# Patient Record
Sex: FEMALE | Race: WHITE | NOT HISPANIC OR LATINO | Employment: OTHER | URBAN - NONMETROPOLITAN AREA
[De-identification: names, ages, dates, MRNs, and addresses within clinical notes are randomized per-mention and may not be internally consistent; named-entity substitution may affect disease eponyms.]

---

## 2017-05-30 RX ORDER — LISINOPRIL 10 MG/1
TABLET ORAL
Qty: 90 TABLET | Refills: 0 | Status: SHIPPED | OUTPATIENT
Start: 2017-05-30 | End: 2017-11-27 | Stop reason: SDUPTHER

## 2017-08-23 ENCOUNTER — LAB (OUTPATIENT)
Dept: LAB | Facility: OTHER | Age: 82
End: 2017-08-23

## 2017-08-23 ENCOUNTER — OFFICE VISIT (OUTPATIENT)
Dept: FAMILY MEDICINE CLINIC | Facility: CLINIC | Age: 82
End: 2017-08-23

## 2017-08-23 VITALS
DIASTOLIC BLOOD PRESSURE: 72 MMHG | BODY MASS INDEX: 22.66 KG/M2 | WEIGHT: 136 LBS | HEIGHT: 65 IN | SYSTOLIC BLOOD PRESSURE: 142 MMHG

## 2017-08-23 DIAGNOSIS — R63.4 ABNORMAL WEIGHT LOSS: ICD-10-CM

## 2017-08-23 DIAGNOSIS — C50.919 PRIMARY MALIGNANT NEOPLASM OF BREAST, UNSPECIFIED LATERALITY (HCC): Primary | ICD-10-CM

## 2017-08-23 LAB
ALBUMIN SERPL-MCNC: 3.6 G/DL (ref 3.2–5.5)
ALBUMIN/GLOB SERPL: 0.9 G/DL (ref 1–3)
ALP SERPL-CCNC: 67 U/L (ref 15–121)
ALT SERPL W P-5'-P-CCNC: 11 U/L (ref 10–60)
ANION GAP SERPL CALCULATED.3IONS-SCNC: 15 MMOL/L (ref 5–15)
AST SERPL-CCNC: 22 U/L (ref 10–60)
BILIRUB SERPL-MCNC: 1.1 MG/DL (ref 0.2–1)
BUN BLD-MCNC: 19 MG/DL (ref 8–25)
BUN/CREAT SERPL: 15.8 (ref 7–25)
CALCIUM SPEC-SCNC: 9.5 MG/DL (ref 8.4–10.8)
CHLORIDE SERPL-SCNC: 102 MMOL/L (ref 100–112)
CO2 SERPL-SCNC: 22 MMOL/L (ref 20–32)
CREAT BLD-MCNC: 1.2 MG/DL (ref 0.4–1.3)
DEPRECATED RDW RBC AUTO: 51.6 FL (ref 36.4–46.3)
EOSINOPHIL # BLD MANUAL: 0.11 10*3/MM3 (ref 0–0.7)
EOSINOPHIL NFR BLD MANUAL: 1 % (ref 0–7)
ERYTHROCYTE [DISTWIDTH] IN BLOOD BY AUTOMATED COUNT: 16.7 % (ref 11.5–14.5)
GFR SERPL CREATININE-BSD FRML MDRD: 43 ML/MIN/1.73 (ref 39–90)
GLOBULIN UR ELPH-MCNC: 3.8 GM/DL (ref 2.5–4.6)
GLUCOSE BLD-MCNC: 125 MG/DL (ref 70–100)
HCT VFR BLD AUTO: 21.8 % (ref 35–45)
HGB BLD-MCNC: 6.4 G/DL (ref 12–15.5)
HYPOCHROMIA BLD QL: ABNORMAL
LYMPHOCYTES # BLD MANUAL: 0.67 10*3/MM3 (ref 0.6–4.2)
LYMPHOCYTES NFR BLD MANUAL: 10 % (ref 0–12)
LYMPHOCYTES NFR BLD MANUAL: 6 % (ref 10–50)
MCH RBC QN AUTO: 25.7 PG (ref 26.5–34)
MCHC RBC AUTO-ENTMCNC: 29.4 G/DL (ref 31.4–36)
MCV RBC AUTO: 87.6 FL (ref 80–98)
MONOCYTES # BLD AUTO: 1.11 10*3/MM3 (ref 0–0.9)
NEUTROPHILS # BLD AUTO: 9.22 10*3/MM3 (ref 2–8.6)
NEUTROPHILS NFR BLD MANUAL: 78 % (ref 37–80)
NEUTS BAND NFR BLD MANUAL: 5 % (ref 0–5)
OVALOCYTES BLD QL SMEAR: ABNORMAL
PLATELET # BLD AUTO: 597 10*3/MM3 (ref 150–450)
PMV BLD AUTO: 9.6 FL (ref 8–12)
POTASSIUM BLD-SCNC: 4.2 MMOL/L (ref 3.4–5.4)
PROT SERPL-MCNC: 7.4 G/DL (ref 6.7–8.2)
RBC # BLD AUTO: 2.49 10*6/MM3 (ref 3.77–5.16)
SMALL PLATELETS BLD QL SMEAR: ABNORMAL
SODIUM BLD-SCNC: 139 MMOL/L (ref 134–146)
WBC MORPH BLD: NORMAL
WBC NRBC COR # BLD: 11.11 10*3/MM3 (ref 3.2–9.8)

## 2017-08-23 PROCEDURE — 99214 OFFICE O/P EST MOD 30 MIN: CPT | Performed by: FAMILY MEDICINE

## 2017-08-23 PROCEDURE — 80053 COMPREHEN METABOLIC PANEL: CPT | Performed by: FAMILY MEDICINE

## 2017-08-23 PROCEDURE — 36415 COLL VENOUS BLD VENIPUNCTURE: CPT | Performed by: FAMILY MEDICINE

## 2017-08-23 PROCEDURE — 85025 COMPLETE CBC W/AUTO DIFF WBC: CPT | Performed by: FAMILY MEDICINE

## 2017-08-23 RX ORDER — ZOLPIDEM TARTRATE 5 MG/1
5 TABLET ORAL NIGHTLY PRN
Qty: 30 TABLET | Refills: 2 | Status: SHIPPED | OUTPATIENT
Start: 2017-08-23 | End: 2017-09-13

## 2017-08-23 RX ORDER — RANITIDINE 150 MG/1
150 TABLET ORAL NIGHTLY
COMMUNITY
End: 2017-08-30 | Stop reason: SDUPTHER

## 2017-08-23 RX ORDER — OXYCODONE HYDROCHLORIDE 15 MG/1
15 TABLET ORAL EVERY 6 HOURS PRN
Qty: 120 TABLET | Refills: 0 | Status: SHIPPED | OUTPATIENT
Start: 2017-08-23 | End: 2017-09-13 | Stop reason: SDUPTHER

## 2017-08-23 NOTE — PROGRESS NOTES
Subjective   Aimee Gilmore is a 85 y.o. female who presents to the office with her daughter with progressive weakness and weight loss decreased appetite and difficulty walking.  She's having a lot of pain in the hips and legs as well as other joints and limits her activity because of this.  Also she's weak and just has a poor appetite most of the time.  She doesn't sleep well her daughter says that she sets up at night just mostly rocking back and forth with pain.  Had a significant decline over the past few months and has not even left the house hardly in the last year.    History of Present Illness   Arthritis   Presents for follow-up visit. Complains of pain, stiffness and joint swelling, reports no joint warmth. The symptoms have been worsening. Affected location: diffuse. Pain is at a severity of 8/10. Associated symptoms include fatigue, pain at night and pain while resting. Pertinent negatives include no diarrhea, dry eyes, dry mouth, dysuria, fever, rash, Raynaud's syndrome, uveitis or weight loss. Compliance with total regimen is %. Compliance with medications is %.     The following portions of the patient's history were reviewed and updated as appropriate: allergies, current medications, past family history, past medical history, past social history, past surgical history and problem list.    Review of Systems   Constitutional: Positive for activity change, appetite change and unexpected weight change.   HENT: Negative.    Respiratory: Negative.  Negative for shortness of breath.    Cardiovascular: Negative.  Negative for chest pain.   Gastrointestinal: Negative.    Musculoskeletal: Positive for arthralgias, back pain, gait problem, myalgias and neck pain. Negative for joint swelling.   Skin: Negative.    Allergic/Immunologic: Negative for immunocompromised state.   Neurological: Positive for weakness and light-headedness. Negative for dizziness, tremors, seizures, syncope and  numbness.   Hematological: Negative.    Psychiatric/Behavioral: Positive for dysphoric mood. Negative for agitation, confusion and sleep disturbance. The patient is nervous/anxious.    All other systems reviewed and are negative.      Objective   Physical Exam   Constitutional: She is oriented to person, place, and time. No distress.   Very thin and frail overall   HENT:   Head: Normocephalic and atraumatic.   Nose: Nose normal.   Mouth/Throat: Oropharynx is clear and moist.   Eyes: Pupils are equal, round, and reactive to light. Right eye exhibits no discharge. Left eye exhibits no discharge. No scleral icterus.   Neck: Normal range of motion. Neck supple. No tracheal deviation present. No thyromegaly present.   Cardiovascular: Normal rate, regular rhythm and intact distal pulses.  Exam reveals gallop. Exam reveals no friction rub.    No murmur heard.  Abdominal: Soft. Bowel sounds are normal. She exhibits no distension and no mass. There is no tenderness. There is no rebound and no guarding.   Musculoskeletal: She exhibits tenderness. She exhibits no edema or deformity.   Neurological: She is alert and oriented to person, place, and time. No cranial nerve deficit. Coordination abnormal.   Skin: Skin is warm and dry. She is not diaphoretic.   Psychiatric: She has a normal mood and affect. Her behavior is normal.   Nursing note and vitals reviewed.      Assessment/Plan   Aimee was seen today for hip pain and insomnia.    Diagnoses and all orders for this visit:    Primary malignant neoplasm of breast, unspecified laterality  -     CT Abdomen Pelvis Without Contrast  -     CT Chest Without Contrast    Abnormal weight loss  -     CBC & Differential; Future  -     Comprehensive Metabolic Panel    Other orders  -     oxyCODONE (ROXICODONE) 15 MG immediate release tablet; Take 1 tablet by mouth Every 6 (Six) Hours As Needed for Moderate Pain  or Severe Pain .  -     zolpidem (AMBIEN) 5 MG tablet; Take 1 tablet by mouth  At Night As Needed for Sleep.  Given her history of breast cancer certainly want to survey for metastatic or recurrent disease given her weight loss and general decline.  We'll check CT of the abdomen and pelvis and consider bone scan    Recommend boost or ensure shakes for supplements and will consider an appetite stimulant depending on outcome of further workup    The current pain medicine she is on is not helping.  Given that we suspect the possibility of recurrent cancer or metastatic cancer, we'll start her on the oxycodone 15 mg tablets up to 4 a day    We'll try Ambien 5 mg to help her sleep at night.  Also try the pain medicine first and if this doesn't help her sleep that they can try the Ambien.  We'll follow up with her on labs as above

## 2017-08-23 NOTE — PROGRESS NOTES
Please call the patient regarding her abnormal result.  She is very anemic.  Needs to be transfused ASAP.  See where they want to go for this and send orders for type and cross 3 units of packed red blood cells and transfuse.  I want to see her back one week after to recheck.  The anemia may be nutritional or maybe blood loss.  After the transfusion we will do more for workup.  We'll probably need to call her daughter or  as she is having some memory issues.

## 2017-08-30 ENCOUNTER — LAB (OUTPATIENT)
Dept: LAB | Facility: OTHER | Age: 82
End: 2017-08-30

## 2017-08-30 ENCOUNTER — OFFICE VISIT (OUTPATIENT)
Dept: FAMILY MEDICINE CLINIC | Facility: CLINIC | Age: 82
End: 2017-08-30

## 2017-08-30 VITALS
HEIGHT: 65 IN | DIASTOLIC BLOOD PRESSURE: 90 MMHG | WEIGHT: 136 LBS | SYSTOLIC BLOOD PRESSURE: 142 MMHG | BODY MASS INDEX: 22.66 KG/M2

## 2017-08-30 DIAGNOSIS — M15.9 OSTEOARTHRITIS OF MULTIPLE JOINTS, UNSPECIFIED OSTEOARTHRITIS TYPE: ICD-10-CM

## 2017-08-30 DIAGNOSIS — C50.919 PRIMARY MALIGNANT NEOPLASM OF BREAST, UNSPECIFIED LATERALITY (HCC): ICD-10-CM

## 2017-08-30 DIAGNOSIS — R16.0 LIVER MASS, RIGHT LOBE: Primary | ICD-10-CM

## 2017-08-30 DIAGNOSIS — D64.9 ANEMIA, UNSPECIFIED TYPE: ICD-10-CM

## 2017-08-30 DIAGNOSIS — F03.90 DEMENTIA WITHOUT BEHAVIORAL DISTURBANCE, UNSPECIFIED DEMENTIA TYPE: ICD-10-CM

## 2017-08-30 LAB
BASOPHILS # BLD AUTO: 0.01 10*3/MM3 (ref 0–0.2)
BASOPHILS NFR BLD AUTO: 0.1 % (ref 0–2)
DEPRECATED RDW RBC AUTO: 57.5 FL (ref 36.4–46.3)
EOSINOPHIL # BLD AUTO: 0.23 10*3/MM3 (ref 0–0.7)
EOSINOPHIL NFR BLD AUTO: 2.3 % (ref 0–7)
ERYTHROCYTE [DISTWIDTH] IN BLOOD BY AUTOMATED COUNT: 17.8 % (ref 11.5–14.5)
HCT VFR BLD AUTO: 40.8 % (ref 35–45)
HGB BLD-MCNC: 12.6 G/DL (ref 12–15.5)
LYMPHOCYTES # BLD AUTO: 1.58 10*3/MM3 (ref 0.6–4.2)
LYMPHOCYTES NFR BLD AUTO: 16 % (ref 10–50)
MCH RBC QN AUTO: 28.1 PG (ref 26.5–34)
MCHC RBC AUTO-ENTMCNC: 30.9 G/DL (ref 31.4–36)
MCV RBC AUTO: 90.9 FL (ref 80–98)
MONOCYTES # BLD AUTO: 1.35 10*3/MM3 (ref 0–0.9)
MONOCYTES NFR BLD AUTO: 13.7 % (ref 0–12)
NEUTROPHILS # BLD AUTO: 6.72 10*3/MM3 (ref 2–8.6)
NEUTROPHILS NFR BLD AUTO: 67.9 % (ref 37–80)
PLATELET # BLD AUTO: 318 10*3/MM3 (ref 150–450)
PMV BLD AUTO: 10.7 FL (ref 8–12)
RBC # BLD AUTO: 4.49 10*6/MM3 (ref 3.77–5.16)
WBC NRBC COR # BLD: 9.89 10*3/MM3 (ref 3.2–9.8)

## 2017-08-30 PROCEDURE — 99214 OFFICE O/P EST MOD 30 MIN: CPT | Performed by: FAMILY MEDICINE

## 2017-08-30 PROCEDURE — 85025 COMPLETE CBC W/AUTO DIFF WBC: CPT | Performed by: FAMILY MEDICINE

## 2017-08-30 PROCEDURE — 36415 COLL VENOUS BLD VENIPUNCTURE: CPT | Performed by: FAMILY MEDICINE

## 2017-08-30 RX ORDER — RANITIDINE 150 MG/1
150 TABLET ORAL NIGHTLY
Qty: 30 TABLET | Refills: 5 | Status: SHIPPED | OUTPATIENT
Start: 2017-08-30 | End: 2018-03-02 | Stop reason: SDUPTHER

## 2017-08-30 RX ORDER — DONEPEZIL HYDROCHLORIDE 5 MG/1
5 TABLET, FILM COATED ORAL NIGHTLY
Qty: 30 TABLET | Refills: 5 | Status: SHIPPED | OUTPATIENT
Start: 2017-08-30 | End: 2018-03-08 | Stop reason: SDUPTHER

## 2017-08-30 RX ORDER — AMITRIPTYLINE HYDROCHLORIDE 10 MG/1
TABLET, FILM COATED ORAL
Qty: 30 TABLET | Refills: 5 | Status: SHIPPED | OUTPATIENT
Start: 2017-08-30 | End: 2018-03-12 | Stop reason: SDUPTHER

## 2017-08-30 NOTE — PROGRESS NOTES
Subjective   Aimee Gilmore is a 85 y.o. female here with her daughter today for follow-up.  She was recently seen for overall failure to thrive including poor appetite and weight loss memory issues increasing arthritis and gait difficulty.  Her daughter has now brought the patient to her home and is trying to care for her there.  She still has a very poor appetite.  Labs done last week revealed that she was markedly anemic with a hemoglobin of 6.  She was transfused and doesn't feel much better.  She had a CT showing a 1.3 cm liver lesion that needs further evaluation.  She does have a history of breast cancer and it is concerning for a metastatic recurrent lesion.  Her daughter and I had talked to her last visit also about some medication for dementia.  They would like to go ahead and start on this.    History of Present Illness     The following portions of the patient's history were reviewed and updated as appropriate: allergies, current medications, past family history, past medical history, past social history, past surgical history and problem list.    Review of Systems   Constitutional: Positive for activity change, appetite change and unexpected weight change.   HENT: Negative.    Respiratory: Negative.  Negative for shortness of breath.    Cardiovascular: Negative.  Negative for chest pain.   Gastrointestinal: Negative.    Musculoskeletal: Positive for arthralgias, back pain, gait problem, myalgias and neck pain. Negative for joint swelling.   Skin: Negative.    Allergic/Immunologic: Negative for immunocompromised state.   Neurological: Positive for weakness and light-headedness. Negative for dizziness, tremors, seizures, syncope and numbness.   Hematological: Negative.    Psychiatric/Behavioral: Positive for dysphoric mood. Negative for agitation, confusion and sleep disturbance. The patient is nervous/anxious.    All other systems reviewed and are negative.      Objective   Physical Exam    Constitutional: She is oriented to person, place, and time. No distress.   Very thin and frail overall   HENT:   Head: Normocephalic and atraumatic.   Nose: Nose normal.   Mouth/Throat: Oropharynx is clear and moist.   Eyes: Pupils are equal, round, and reactive to light. Right eye exhibits no discharge. Left eye exhibits no discharge. No scleral icterus.   Neck: Normal range of motion. Neck supple. No tracheal deviation present. No thyromegaly present.   Cardiovascular: Normal rate, regular rhythm and intact distal pulses.  Exam reveals gallop. Exam reveals no friction rub.    No murmur heard.  Abdominal: Soft. Bowel sounds are normal. She exhibits no distension and no mass. There is no tenderness. There is no rebound and no guarding.   Musculoskeletal: She exhibits tenderness. She exhibits no edema or deformity.   Neurological: She is alert and oriented to person, place, and time. No cranial nerve deficit. Coordination abnormal.   Skin: Skin is warm and dry. She is not diaphoretic.   Psychiatric: She has a normal mood and affect. Her behavior is normal.   Nursing note and vitals reviewed.      Assessment/Plan   Aimee was seen today for follow-up.    Diagnoses and all orders for this visit:    Liver mass, right lobe  -     CT Abdomen Pelvis With Contrast    Primary malignant neoplasm of breast, unspecified laterality    Osteoarthritis of multiple joints, unspecified osteoarthritis type    Anemia, unspecified type  -     CBC & Differential; Future    Dementia without behavioral disturbance, unspecified dementia type    Other orders  -     raNITIdine (ZANTAC) 150 MG tablet; Take 1 tablet by mouth Every Night.  -     donepezil (ARICEPT) 5 MG tablet; Take 1 tablet by mouth Every Night.  -     amitriptyline (ELAVIL) 10 MG tablet; 1 tablet nightly    Will get a CT with contrast to further characterize the liver lesion and follow-up accordingly    We'll add amitriptyline at bedtime to help with her appetite and sleep  issues and continue Aricept at 5 mg    Discussed home health and her daughter will let me know if they decide for this and we will send him out to help    We'll get another CBC today to see if further transfusion is needed    We'll start Aricept 5 mg for dementia symptoms.  Cautioned that this could affect her appetite and family will watch for signs of this very closely     Labs are reviewed with patient.      Lab on 08/23/2017   Component Date Value Ref Range Status   • WBC 08/23/2017 11.11* 3.20 - 9.80 10*3/mm3 Final   • RBC 08/23/2017 2.49* 3.77 - 5.16 10*6/mm3 Final   • Hemoglobin 08/23/2017 6.4* 12.0 - 15.5 g/dL Final   • Hematocrit 08/23/2017 21.8* 35.0 - 45.0 % Final   • MCV 08/23/2017 87.6  80.0 - 98.0 fL Final   • MCH 08/23/2017 25.7* 26.5 - 34.0 pg Final   • MCHC 08/23/2017 29.4* 31.4 - 36.0 g/dL Final   • RDW 08/23/2017 16.7* 11.5 - 14.5 % Final   • RDW-SD 08/23/2017 51.6* 36.4 - 46.3 fl Final   • MPV 08/23/2017 9.6  8.0 - 12.0 fL Final   • Platelets 08/23/2017 597* 150 - 450 10*3/mm3 Final   • Neutrophil % 08/23/2017 78.0  37.0 - 80.0 % Final   • Lymphocyte % 08/23/2017 6.0* 10.0 - 50.0 % Final   • Monocyte % 08/23/2017 10.0  0.0 - 12.0 % Final   • Eosinophil % 08/23/2017 1.0  0.0 - 7.0 % Final   • Bands %  08/23/2017 5.0  0.0 - 5.0 % Final   • Neutrophils Absolute 08/23/2017 9.22* 2.00 - 8.60 10*3/mm3 Final   • Lymphocytes Absolute 08/23/2017 0.67  0.60 - 4.20 10*3/mm3 Final   • Monocytes Absolute 08/23/2017 1.11* 0.00 - 0.90 10*3/mm3 Final   • Eosinophils Absolute 08/23/2017 0.11  0.00 - 0.70 10*3/mm3 Final   • Hypochromia 08/23/2017 Slight/1+  None Seen Final   • Ovalocytes 08/23/2017 Slight/1+  None Seen Final   • WBC Morphology 08/23/2017 Normal  Normal Final   • Platelet Estimate 08/23/2017 Increased  Normal Final   Office Visit on 08/23/2017   Component Date Value Ref Range Status   • Glucose 08/23/2017 125* 70 - 100 mg/dL Final   • BUN 08/23/2017 19  8 - 25 mg/dL Final   • Creatinine 08/23/2017  1.20  0.40 - 1.30 mg/dL Final   • Sodium 08/23/2017 139  134 - 146 mmol/L Final   • Potassium 08/23/2017 4.2  3.4 - 5.4 mmol/L Final   • Chloride 08/23/2017 102  100 - 112 mmol/L Final   • CO2 08/23/2017 22.0  20.0 - 32.0 mmol/L Final   • Calcium 08/23/2017 9.5  8.4 - 10.8 mg/dL Final   • Total Protein 08/23/2017 7.4  6.7 - 8.2 g/dL Final   • Albumin 08/23/2017 3.60  3.20 - 5.50 g/dL Final   • ALT (SGPT) 08/23/2017 11  10 - 60 U/L Final   • AST (SGOT) 08/23/2017 22  10 - 60 U/L Final   • Alkaline Phosphatase 08/23/2017 67  15 - 121 U/L Final   • Total Bilirubin 08/23/2017 1.1* 0.2 - 1.0 mg/dL Final   • eGFR Non  Amer 08/23/2017 43  39 - 90 mL/min/1.73 Final   • Globulin 08/23/2017 3.8  2.5 - 4.6 gm/dL Final   • A/G Ratio 08/23/2017 0.9* 1.0 - 3.0 g/dL Final   • BUN/Creatinine Ratio 08/23/2017 15.8  7.0 - 25.0 Final   • Anion Gap 08/23/2017 15.0  5.0 - 15.0 mmol/L Final   Abstract on 08/17/2017   Component Date Value Ref Range Status   •  Mammogram 12/17/2014 complete   Final   •  Pap smear 05/19/2009 complete   Final   ]

## 2017-09-05 RX ORDER — PROMETHAZINE HYDROCHLORIDE 25 MG/1
25 TABLET ORAL EVERY 6 HOURS PRN
Qty: 56 TABLET | Refills: 1 | Status: SHIPPED | OUTPATIENT
Start: 2017-09-05 | End: 2017-09-13 | Stop reason: SDUPTHER

## 2017-09-08 ENCOUNTER — DOCUMENTATION (OUTPATIENT)
Dept: FAMILY MEDICINE CLINIC | Facility: CLINIC | Age: 82
End: 2017-09-08

## 2017-09-08 NOTE — PROGRESS NOTES
Insurance will not cover promethazine, pa denied.  They will only cover if post op. They prefer ondansetron.   I was able to get amitriptyline approved.

## 2017-09-11 RX ORDER — ONDANSETRON 4 MG/1
4 TABLET, FILM COATED ORAL EVERY 4 HOURS PRN
Qty: 20 TABLET | Refills: 1 | Status: SHIPPED | OUTPATIENT
Start: 2017-09-11 | End: 2017-11-27 | Stop reason: SDUPTHER

## 2017-09-13 ENCOUNTER — OFFICE VISIT (OUTPATIENT)
Dept: FAMILY MEDICINE CLINIC | Facility: CLINIC | Age: 82
End: 2017-09-13

## 2017-09-13 VITALS
BODY MASS INDEX: 22.66 KG/M2 | WEIGHT: 136 LBS | DIASTOLIC BLOOD PRESSURE: 86 MMHG | HEIGHT: 65 IN | SYSTOLIC BLOOD PRESSURE: 138 MMHG

## 2017-09-13 DIAGNOSIS — R62.7 ADULT FAILURE TO THRIVE: ICD-10-CM

## 2017-09-13 DIAGNOSIS — R93.2 ABNORMAL CT SCAN, LIVER: ICD-10-CM

## 2017-09-13 DIAGNOSIS — G47.00 INSOMNIA, UNSPECIFIED TYPE: ICD-10-CM

## 2017-09-13 DIAGNOSIS — M15.9 OSTEOARTHRITIS OF MULTIPLE JOINTS, UNSPECIFIED OSTEOARTHRITIS TYPE: Primary | ICD-10-CM

## 2017-09-13 PROCEDURE — 99214 OFFICE O/P EST MOD 30 MIN: CPT | Performed by: FAMILY MEDICINE

## 2017-09-13 RX ORDER — PROMETHAZINE HYDROCHLORIDE 25 MG/1
25 TABLET ORAL EVERY 6 HOURS PRN
Qty: 56 TABLET | Refills: 1 | Status: SHIPPED | OUTPATIENT
Start: 2017-09-13 | End: 2017-11-06 | Stop reason: SDUPTHER

## 2017-09-13 RX ORDER — OXYCODONE HYDROCHLORIDE 15 MG/1
15 TABLET ORAL EVERY 6 HOURS PRN
Qty: 120 TABLET | Refills: 0 | Status: SHIPPED | OUTPATIENT
Start: 2017-09-13 | End: 2017-10-09

## 2017-09-13 RX ORDER — ESCITALOPRAM OXALATE 10 MG/1
TABLET ORAL
Qty: 30 TABLET | Refills: 5 | Status: SHIPPED | OUTPATIENT
Start: 2017-09-13 | End: 2018-02-12 | Stop reason: SDUPTHER

## 2017-09-13 NOTE — PROGRESS NOTES
Subjective   Aimee Guadalupe Gilmore is a 85 y.o. female here with her daughter today for follow-up on failure to thrive, poor memory, overall arthritis pain, weakness weight loss.  She had an abnormal CT scan showing what may be a liver mass.  With her history of breast cancer this was concerning for metastatic lesion.  She developed what she thought was a stomach virus and wasn't able to go get the follow-up CT with contrast done.  I spoke with the patient and her daughter today and both stated that the patient has decided that even if it is a metastatic lesion or cancer she would opt for not treating anything and therefore we will not pursue any further testing.  She is getting some home health and doing physical therapy which seems to be making a slight improvement overall.  She's also having difficulty sleeping at night and Ambien did not help.    History of Present Illness     The following portions of the patient's history were reviewed and updated as appropriate: allergies, current medications, past family history, past medical history, past social history, past surgical history and problem list.    Review of Systems   Constitutional: Positive for activity change, appetite change and unexpected weight change.   HENT: Negative.    Respiratory: Negative.  Negative for shortness of breath.    Cardiovascular: Negative.  Negative for chest pain.   Gastrointestinal: Negative.    Musculoskeletal: Positive for arthralgias, back pain, gait problem, myalgias and neck pain. Negative for joint swelling.   Skin: Negative.    Allergic/Immunologic: Negative for immunocompromised state.   Neurological: Positive for weakness and light-headedness. Negative for dizziness, tremors, seizures, syncope and numbness.   Hematological: Negative.    Psychiatric/Behavioral: Positive for dysphoric mood. Negative for agitation, confusion and sleep disturbance. The patient is nervous/anxious.    All other systems reviewed and are  negative.      Objective   Physical Exam   Constitutional: She is oriented to person, place, and time. No distress.   Very thin and frail overall   HENT:   Head: Normocephalic and atraumatic.   Nose: Nose normal.   Mouth/Throat: Oropharynx is clear and moist.   Eyes: Pupils are equal, round, and reactive to light. Right eye exhibits no discharge. Left eye exhibits no discharge. No scleral icterus.   Neck: Normal range of motion. Neck supple. No tracheal deviation present. No thyromegaly present.   Cardiovascular: Normal rate, regular rhythm and intact distal pulses.  Exam reveals gallop. Exam reveals no friction rub.    No murmur heard.  Abdominal: Soft. Bowel sounds are normal. She exhibits no distension and no mass. There is no tenderness. There is no rebound and no guarding.   Musculoskeletal: She exhibits tenderness. She exhibits no edema or deformity.   Neurological: She is alert and oriented to person, place, and time. No cranial nerve deficit. Coordination abnormal.   Skin: Skin is warm and dry. She is not diaphoretic.   Psychiatric: She has a normal mood and affect. Her behavior is normal.   Nursing note and vitals reviewed.      Assessment/Plan   Aimee was seen today for follow-up.    Diagnoses and all orders for this visit:    Osteoarthritis of multiple joints, unspecified osteoarthritis type    Abnormal CT scan, liver    Adult failure to thrive    Insomnia, unspecified type    Other orders  -     promethazine (PHENERGAN) 25 MG tablet; Take 1 tablet by mouth Every 6 (Six) Hours As Needed for Nausea or Vomiting.  -     oxyCODONE (ROXICODONE) 15 MG immediate release tablet; Take 1 tablet by mouth Every 6 (Six) Hours As Needed for Moderate Pain  or Severe Pain .  -     escitalopram (LEXAPRO) 10 MG tablet; 1 TABLET DAILY AT BEDTIME    Will add Lexapro for anxiety which may help her sleep at night.  Continue with the oxycodone.  It's due in 10 days preprinted today to say for a trip    Continue with physical  therapy and home health    We'll have them check labs when due    I agree with the decision not to do the CT given that the findings would not change her plan of treatment.  There is an understanding that there could be a metastatic lesion in the liver and the patient and her daughter both agree that the patient would not undergo any further treatment even if it is.      We'll continue with the amitriptyline as it may help her appetite although it did not help much for sleep.         Lab on 08/30/2017   Component Date Value Ref Range Status   • WBC 08/30/2017 9.89* 3.20 - 9.80 10*3/mm3 Final   • RBC 08/30/2017 4.49  3.77 - 5.16 10*6/mm3 Final   • Hemoglobin 08/30/2017 12.6  12.0 - 15.5 g/dL Final   • Hematocrit 08/30/2017 40.8  35.0 - 45.0 % Final   • MCV 08/30/2017 90.9  80.0 - 98.0 fL Final   • MCH 08/30/2017 28.1  26.5 - 34.0 pg Final   • MCHC 08/30/2017 30.9* 31.4 - 36.0 g/dL Final   • RDW 08/30/2017 17.8* 11.5 - 14.5 % Final   • RDW-SD 08/30/2017 57.5* 36.4 - 46.3 fl Final   • MPV 08/30/2017 10.7  8.0 - 12.0 fL Final   • Platelets 08/30/2017 318  150 - 450 10*3/mm3 Final   • Neutrophil % 08/30/2017 67.9  37.0 - 80.0 % Final   • Lymphocyte % 08/30/2017 16.0  10.0 - 50.0 % Final   • Monocyte % 08/30/2017 13.7* 0.0 - 12.0 % Final   • Eosinophil % 08/30/2017 2.3  0.0 - 7.0 % Final   • Basophil % 08/30/2017 0.1  0.0 - 2.0 % Final   • Neutrophils, Absolute 08/30/2017 6.72  2.00 - 8.60 10*3/mm3 Final   • Lymphocytes, Absolute 08/30/2017 1.58  0.60 - 4.20 10*3/mm3 Final   • Monocytes, Absolute 08/30/2017 1.35* 0.00 - 0.90 10*3/mm3 Final   • Eosinophils, Absolute 08/30/2017 0.23  0.00 - 0.70 10*3/mm3 Final   • Basophils, Absolute 08/30/2017 0.01  0.00 - 0.20 10*3/mm3 Final   Lab on 08/23/2017   Component Date Value Ref Range Status   • WBC 08/23/2017 11.11* 3.20 - 9.80 10*3/mm3 Final   • RBC 08/23/2017 2.49* 3.77 - 5.16 10*6/mm3 Final   • Hemoglobin 08/23/2017 6.4* 12.0 - 15.5 g/dL Final   • Hematocrit 08/23/2017 21.8*  35.0 - 45.0 % Final   • MCV 08/23/2017 87.6  80.0 - 98.0 fL Final   • MCH 08/23/2017 25.7* 26.5 - 34.0 pg Final   • MCHC 08/23/2017 29.4* 31.4 - 36.0 g/dL Final   • RDW 08/23/2017 16.7* 11.5 - 14.5 % Final   • RDW-SD 08/23/2017 51.6* 36.4 - 46.3 fl Final   • MPV 08/23/2017 9.6  8.0 - 12.0 fL Final   • Platelets 08/23/2017 597* 150 - 450 10*3/mm3 Final   • Neutrophil % 08/23/2017 78.0  37.0 - 80.0 % Final   • Lymphocyte % 08/23/2017 6.0* 10.0 - 50.0 % Final   • Monocyte % 08/23/2017 10.0  0.0 - 12.0 % Final   • Eosinophil % 08/23/2017 1.0  0.0 - 7.0 % Final   • Bands %  08/23/2017 5.0  0.0 - 5.0 % Final   • Neutrophils Absolute 08/23/2017 9.22* 2.00 - 8.60 10*3/mm3 Final   • Lymphocytes Absolute 08/23/2017 0.67  0.60 - 4.20 10*3/mm3 Final   • Monocytes Absolute 08/23/2017 1.11* 0.00 - 0.90 10*3/mm3 Final   • Eosinophils Absolute 08/23/2017 0.11  0.00 - 0.70 10*3/mm3 Final   • Hypochromia 08/23/2017 Slight/1+  None Seen Final   • Ovalocytes 08/23/2017 Slight/1+  None Seen Final   • WBC Morphology 08/23/2017 Normal  Normal Final   • Platelet Estimate 08/23/2017 Increased  Normal Final   Office Visit on 08/23/2017   Component Date Value Ref Range Status   • Glucose 08/23/2017 125* 70 - 100 mg/dL Final   • BUN 08/23/2017 19  8 - 25 mg/dL Final   • Creatinine 08/23/2017 1.20  0.40 - 1.30 mg/dL Final   • Sodium 08/23/2017 139  134 - 146 mmol/L Final   • Potassium 08/23/2017 4.2  3.4 - 5.4 mmol/L Final   • Chloride 08/23/2017 102  100 - 112 mmol/L Final   • CO2 08/23/2017 22.0  20.0 - 32.0 mmol/L Final   • Calcium 08/23/2017 9.5  8.4 - 10.8 mg/dL Final   • Total Protein 08/23/2017 7.4  6.7 - 8.2 g/dL Final   • Albumin 08/23/2017 3.60  3.20 - 5.50 g/dL Final   • ALT (SGPT) 08/23/2017 11  10 - 60 U/L Final   • AST (SGOT) 08/23/2017 22  10 - 60 U/L Final   • Alkaline Phosphatase 08/23/2017 67  15 - 121 U/L Final   • Total Bilirubin 08/23/2017 1.1* 0.2 - 1.0 mg/dL Final   • eGFR Non  Amer 08/23/2017 43  39 - 90  mL/min/1.73 Final   • Globulin 08/23/2017 3.8  2.5 - 4.6 gm/dL Final   • A/G Ratio 08/23/2017 0.9* 1.0 - 3.0 g/dL Final   • BUN/Creatinine Ratio 08/23/2017 15.8  7.0 - 25.0 Final   • Anion Gap 08/23/2017 15.0  5.0 - 15.0 mmol/L Final   Abstract on 08/17/2017   Component Date Value Ref Range Status   •  Mammogram 12/17/2014 complete   Final   • HM Pap smear 05/19/2009 complete   Final   ]

## 2017-10-09 ENCOUNTER — OFFICE VISIT (OUTPATIENT)
Dept: FAMILY MEDICINE CLINIC | Facility: CLINIC | Age: 82
End: 2017-10-09

## 2017-10-09 VITALS
WEIGHT: 136 LBS | HEART RATE: 134 BPM | DIASTOLIC BLOOD PRESSURE: 62 MMHG | HEIGHT: 65 IN | SYSTOLIC BLOOD PRESSURE: 170 MMHG | OXYGEN SATURATION: 97 % | BODY MASS INDEX: 22.66 KG/M2

## 2017-10-09 DIAGNOSIS — C50.919 PRIMARY MALIGNANT NEOPLASM OF BREAST, UNSPECIFIED LATERALITY (HCC): Primary | ICD-10-CM

## 2017-10-09 DIAGNOSIS — R62.7 FAILURE TO THRIVE IN ADULT: ICD-10-CM

## 2017-10-09 DIAGNOSIS — F03.90 DEMENTIA WITHOUT BEHAVIORAL DISTURBANCE, UNSPECIFIED DEMENTIA TYPE: ICD-10-CM

## 2017-10-09 PROCEDURE — 99214 OFFICE O/P EST MOD 30 MIN: CPT | Performed by: FAMILY MEDICINE

## 2017-10-09 RX ORDER — RISPERIDONE 0.5 MG/1
0.5 TABLET ORAL DAILY
Qty: 30 TABLET | Refills: 5 | Status: SHIPPED | OUTPATIENT
Start: 2017-10-09 | End: 2019-01-01 | Stop reason: SDUPTHER

## 2017-10-09 RX ORDER — OXYCODONE HCL 20 MG/1
20 TABLET, FILM COATED, EXTENDED RELEASE ORAL EVERY 12 HOURS
Qty: 56 TABLET | Refills: 0 | Status: SHIPPED | OUTPATIENT
Start: 2017-10-09 | End: 2017-10-24 | Stop reason: SDUPTHER

## 2017-10-09 NOTE — PROGRESS NOTES
Subjective   Aimee Guadalupe Gilmore is a 85 y.o. female with a history of breast cancer here today with her daughter for follow-up.  She has failure to thrive with dementia, Alzheimer's type.  Daughter says that she's been eating a bit better at home.  She is having what her daughter terms as sundowning, where she is getting more confused in the afternoon.  She wonders if there something like a take in midafternoon.  At this point the patient is bed ridden.  She is not able to stand and walk.  She is in a wheelchair and she is having to rely on family members for all activities of daily living.  She is staying with her daughter at this point.  Her pain medicine doesn't seem to be working as well.  He works for a couple of hours and then starts to wear off at which time her pain returns and then she wakes 6 hours between doses to take the next and continues to cycle all day.  Her daughter says that the patient shifts in her chair often and wiggles around in discomfort.  History of Present Illness     The following portions of the patient's history were reviewed and updated as appropriate: allergies, current medications, past family history, past medical history, past social history, past surgical history and problem list.    Review of Systems   Constitutional: Positive for activity change, appetite change and unexpected weight change.   HENT: Negative.    Respiratory: Negative.  Negative for shortness of breath.    Cardiovascular: Negative.  Negative for chest pain.   Gastrointestinal: Negative.    Musculoskeletal: Positive for arthralgias, back pain, gait problem, myalgias and neck pain. Negative for joint swelling.   Skin: Negative.    Allergic/Immunologic: Negative for immunocompromised state.   Neurological: Positive for weakness and light-headedness. Negative for dizziness, tremors, seizures, syncope and numbness.   Hematological: Negative.    Psychiatric/Behavioral: Positive for dysphoric mood. Negative for  agitation, confusion and sleep disturbance. The patient is nervous/anxious.    All other systems reviewed and are negative.      Objective   Physical Exam   Constitutional: She is oriented to person, place, and time. No distress.   Very thin and frail overall   HENT:   Head: Normocephalic and atraumatic.   Nose: Nose normal.   Mouth/Throat: Oropharynx is clear and moist.   Eyes: Pupils are equal, round, and reactive to light. Right eye exhibits no discharge. Left eye exhibits no discharge. No scleral icterus.   Neck: Normal range of motion. Neck supple. No tracheal deviation present. No thyromegaly present.   Cardiovascular: Normal rate, regular rhythm and intact distal pulses.  Exam reveals gallop. Exam reveals no friction rub.    No murmur heard.  Abdominal: Soft. Bowel sounds are normal. She exhibits no distension and no mass. There is no tenderness. There is no rebound and no guarding.   Musculoskeletal: She exhibits tenderness. She exhibits no edema or deformity.   Neurological: She is alert and oriented to person, place, and time. No cranial nerve deficit. Coordination abnormal.   Skin: Skin is warm and dry. She is not diaphoretic.   Psychiatric: She has a normal mood and affect. Her behavior is normal.   Nursing note and vitals reviewed.      Assessment/Plan   Aimee was seen today for check up.    Diagnoses and all orders for this visit:    Primary malignant neoplasm of breast, unspecified laterality    Failure to thrive in adult    Dementia without behavioral disturbance, unspecified dementia type    Other orders  -     oxyCODONE ER (OXYCONTIN) 20 MG 12 hr tablet; Take 1 tablet by mouth Every 12 (Twelve) Hours for 28 days.  -     risperiDONE (RISPERDAL) 0.5 MG tablet; Take 1 tablet by mouth Daily.    Will discontinue the short-acting oxycodone and switch to the long-acting so that she may get more controlled release rather than periods of pain during the day until the medicine kicks in.  Her daughter is  dispensing medicines to her and will watch closely for signs of intolerance or oversedation.  Home health is no longer seeing her.  She may be a good candidate for hospice care and will discuss this with her daughter.    Add Risperdal, one dose in the afternoons for the confusion and anxiety symptoms.    The patient has read and signed the [facility] Controlled Substance Contract.  I will continue to see patient for regular follow up appointments. Patient is well controlled on the medication.  BEN has been reviewed by me and is updated every 3 months. The patient is aware of the potential for addiction and dependence.

## 2017-10-24 RX ORDER — OXYCODONE AND ACETAMINOPHEN 10; 325 MG/1; MG/1
1 TABLET ORAL EVERY 6 HOURS PRN
Qty: 120 TABLET | Refills: 0 | Status: SHIPPED | OUTPATIENT
Start: 2017-10-24 | End: 2017-11-09 | Stop reason: SDUPTHER

## 2017-10-24 RX ORDER — OXYCODONE HCL 20 MG/1
20 TABLET, FILM COATED, EXTENDED RELEASE ORAL EVERY 12 HOURS
Qty: 46 TABLET | Refills: 0 | Status: SHIPPED | OUTPATIENT
Start: 2017-10-24 | End: 2017-10-24 | Stop reason: ALTCHOICE

## 2017-11-06 RX ORDER — PROMETHAZINE HYDROCHLORIDE 25 MG/1
TABLET ORAL
Qty: 56 TABLET | Refills: 1 | Status: SHIPPED | OUTPATIENT
Start: 2017-11-06 | End: 2017-11-27 | Stop reason: SDUPTHER

## 2017-11-09 ENCOUNTER — OFFICE VISIT (OUTPATIENT)
Dept: FAMILY MEDICINE CLINIC | Facility: CLINIC | Age: 82
End: 2017-11-09

## 2017-11-09 VITALS — WEIGHT: 136 LBS | BODY MASS INDEX: 22.66 KG/M2 | HEIGHT: 65 IN

## 2017-11-09 DIAGNOSIS — R63.4 WEIGHT LOSS, UNINTENTIONAL: ICD-10-CM

## 2017-11-09 DIAGNOSIS — M15.9 OSTEOARTHRITIS OF MULTIPLE JOINTS, UNSPECIFIED OSTEOARTHRITIS TYPE: ICD-10-CM

## 2017-11-09 DIAGNOSIS — F03.90 DEMENTIA WITHOUT BEHAVIORAL DISTURBANCE, UNSPECIFIED DEMENTIA TYPE: Primary | ICD-10-CM

## 2017-11-09 DIAGNOSIS — R11.2 NAUSEA AND VOMITING, INTRACTABILITY OF VOMITING NOT SPECIFIED, UNSPECIFIED VOMITING TYPE: ICD-10-CM

## 2017-11-09 PROCEDURE — 99214 OFFICE O/P EST MOD 30 MIN: CPT | Performed by: FAMILY MEDICINE

## 2017-11-09 RX ORDER — ONDANSETRON 2 MG/ML
4 INJECTION INTRAMUSCULAR; INTRAVENOUS EVERY 6 HOURS PRN
Status: SHIPPED | OUTPATIENT
Start: 2017-11-09

## 2017-11-09 RX ORDER — OXYCODONE AND ACETAMINOPHEN 10; 325 MG/1; MG/1
1 TABLET ORAL EVERY 6 HOURS PRN
Qty: 120 TABLET | Refills: 0 | Status: SHIPPED | OUTPATIENT
Start: 2017-11-09 | End: 2017-12-19 | Stop reason: SDUPTHER

## 2017-11-09 NOTE — PROGRESS NOTES
Subjective   Aimee Gilmore is a 85 y.o. female with a history of breast cancer here today with her daughter for follow-up.  She has failure to thrive with dementia, Alzheimer's type.  They have home health for a while but are no longer receiving health services from them.  Her daughter said the patient which is more comfortable with her doing her care.  She has been eating a bit better and has been a bit more alert.  The change in the pain medicine seem to help significantly and she says at this point it is adequately controlled.  .  History of Present Illness     The following portions of the patient's history were reviewed and updated as appropriate: allergies, current medications, past family history, past medical history, past social history, past surgical history and problem list.    Review of Systems   Constitutional: Positive for activity change, appetite change and unexpected weight change.   HENT: Negative.    Respiratory: Negative.  Negative for shortness of breath.    Cardiovascular: Negative.  Negative for chest pain.   Gastrointestinal: Negative.    Musculoskeletal: Positive for arthralgias, back pain, gait problem, myalgias and neck pain. Negative for joint swelling.   Skin: Negative.    Allergic/Immunologic: Negative for immunocompromised state.   Neurological: Positive for weakness and light-headedness. Negative for dizziness, tremors, seizures, syncope and numbness.   Hematological: Negative.    Psychiatric/Behavioral: Positive for dysphoric mood. Negative for agitation, confusion and sleep disturbance. The patient is nervous/anxious.    All other systems reviewed and are negative.    Objective   Physical Exam   Constitutional: She is oriented to person, place, and time. No distress.   Very thin and frail overall   HENT:   Head: Normocephalic and atraumatic.   Nose: Nose normal.   Mouth/Throat: Oropharynx is clear and moist.   Eyes: Pupils are equal, round, and reactive to light. Right eye  exhibits no discharge. Left eye exhibits no discharge. No scleral icterus.   Neck: Normal range of motion. Neck supple. No tracheal deviation present. No thyromegaly present.   Cardiovascular: Normal rate, regular rhythm and intact distal pulses.  Exam reveals gallop. Exam reveals no friction rub.    No murmur heard.  Abdominal: Soft. Bowel sounds are normal. She exhibits no distension and no mass. There is no tenderness. There is no rebound and no guarding.   Musculoskeletal: She exhibits tenderness. She exhibits no edema or deformity.   Neurological: She is alert and oriented to person, place, and time. No cranial nerve deficit. Coordination abnormal.   Skin: Skin is warm and dry. She is not diaphoretic.   Psychiatric: She has a normal mood and affect. Her behavior is normal.   Nursing note and vitals reviewed.    Assessment/Plan   Aimee was seen today for follow-up and medication problem.    Diagnoses and all orders for this visit:    Dementia without behavioral disturbance, unspecified dementia type    Osteoarthritis of multiple joints, unspecified osteoarthritis type    Weight loss, unintentional    Other orders  -     oxyCODONE-acetaminophen (PERCOCET)  MG per tablet; Take 1 tablet by mouth Every 6 (Six) Hours As Needed for Moderate Pain .    Continue with the Percocet for pain as above    Continue with Aricept for dementia    We're unable to weigh her today as she was still weak and in a wheelchair and she cannot stand independently the feel like she is gaining some weight.  Continue with current diet and supplements and recheck in 2 months    The patient has read and signed the [facility] Controlled Substance Contract.  I will continue to see patient for regular follow up appointments. Patient is well controlled on the medication.  BEN has been reviewed by me and is updated every 3 months. The patient is aware of the potential for addiction and dependence.

## 2017-11-27 RX ORDER — PROMETHAZINE HYDROCHLORIDE 25 MG/1
25 TABLET ORAL EVERY 6 HOURS PRN
Qty: 56 TABLET | Refills: 1 | Status: SHIPPED | OUTPATIENT
Start: 2017-11-27 | End: 2018-03-14 | Stop reason: SDUPTHER

## 2017-11-27 RX ORDER — ONDANSETRON 4 MG/1
4 TABLET, FILM COATED ORAL EVERY 4 HOURS PRN
Qty: 20 TABLET | Refills: 1 | Status: SHIPPED | OUTPATIENT
Start: 2017-11-27 | End: 2018-01-24 | Stop reason: SDUPTHER

## 2017-11-27 RX ORDER — LISINOPRIL 10 MG/1
10 TABLET ORAL DAILY
Qty: 90 TABLET | Refills: 0 | Status: SHIPPED | OUTPATIENT
Start: 2017-11-27 | End: 2017-12-19 | Stop reason: SDUPTHER

## 2017-12-19 RX ORDER — OXYCODONE AND ACETAMINOPHEN 10; 325 MG/1; MG/1
1 TABLET ORAL EVERY 6 HOURS PRN
Qty: 120 TABLET | Refills: 0 | Status: SHIPPED | OUTPATIENT
Start: 2017-12-19 | End: 2018-01-09 | Stop reason: SDUPTHER

## 2017-12-19 RX ORDER — LISINOPRIL 10 MG/1
10 TABLET ORAL DAILY
Qty: 90 TABLET | Refills: 1 | Status: SHIPPED | OUTPATIENT
Start: 2017-12-19 | End: 2018-01-30 | Stop reason: SDUPTHER

## 2018-01-02 RX ORDER — LISINOPRIL 10 MG/1
TABLET ORAL
Qty: 90 TABLET | Refills: 0 | Status: SHIPPED | OUTPATIENT
Start: 2018-01-02 | End: 2018-05-18 | Stop reason: SDUPTHER

## 2018-01-09 ENCOUNTER — OFFICE VISIT (OUTPATIENT)
Dept: FAMILY MEDICINE CLINIC | Facility: CLINIC | Age: 83
End: 2018-01-09

## 2018-01-09 VITALS
BODY MASS INDEX: 31.47 KG/M2 | HEIGHT: 55 IN | DIASTOLIC BLOOD PRESSURE: 58 MMHG | SYSTOLIC BLOOD PRESSURE: 98 MMHG | WEIGHT: 136 LBS

## 2018-01-09 DIAGNOSIS — M15.9 OSTEOARTHRITIS OF MULTIPLE JOINTS, UNSPECIFIED OSTEOARTHRITIS TYPE: ICD-10-CM

## 2018-01-09 DIAGNOSIS — R63.4 ABNORMAL WEIGHT LOSS: ICD-10-CM

## 2018-01-09 DIAGNOSIS — J06.9 ACUTE URI: Primary | ICD-10-CM

## 2018-01-09 PROCEDURE — 99214 OFFICE O/P EST MOD 30 MIN: CPT | Performed by: FAMILY MEDICINE

## 2018-01-09 RX ORDER — OXYCODONE AND ACETAMINOPHEN 10; 325 MG/1; MG/1
1 TABLET ORAL EVERY 6 HOURS PRN
Qty: 120 TABLET | Refills: 0 | Status: SHIPPED | OUTPATIENT
Start: 2018-01-09 | End: 2018-01-30 | Stop reason: SDUPTHER

## 2018-01-09 RX ORDER — AZITHROMYCIN 250 MG/1
TABLET, FILM COATED ORAL
Qty: 6 TABLET | Refills: 0 | Status: SHIPPED | OUTPATIENT
Start: 2018-01-09 | End: 2018-01-14

## 2018-01-09 NOTE — PROGRESS NOTES
Subjective   Aimee Guadalupe Gilmore is a 86 y.o. female with a history of breast cancer here today with her daughter for follow-up.  They're pleased to say that her appetite has been better and they feel she may have gained a couple pounds but she's too weak to stand today and way.  She is having a dry hacking cough for the last few days with some chest and head congestion.  She needs refills of pain medication.  .  Cough   This is a new problem. The current episode started yesterday. The problem has been gradually worsening. The problem occurs every few minutes. The cough is non-productive. Associated symptoms include myalgias. Pertinent negatives include no chest pain or shortness of breath.      The following portions of the patient's history were reviewed and updated as appropriate: allergies, current medications, past family history, past medical history, past social history, past surgical history and problem list.    Review of Systems   Constitutional: Positive for activity change, appetite change and unexpected weight change.   HENT: Negative.    Respiratory: Positive for cough. Negative for shortness of breath.    Cardiovascular: Negative.  Negative for chest pain.   Gastrointestinal: Negative.    Musculoskeletal: Positive for arthralgias, back pain, gait problem, myalgias and neck pain. Negative for joint swelling.   Skin: Negative.    Allergic/Immunologic: Negative for immunocompromised state.   Neurological: Positive for weakness and light-headedness. Negative for dizziness, tremors, seizures, syncope and numbness.   Hematological: Negative.    Psychiatric/Behavioral: Positive for dysphoric mood. Negative for agitation, confusion and sleep disturbance. The patient is nervous/anxious.    All other systems reviewed and are negative.    Objective   Physical Exam   Constitutional: She is oriented to person, place, and time. No distress.   Very thin and frail overall   HENT:   Head: Normocephalic and  atraumatic.   Nose: Nose normal.   Mouth/Throat: Oropharynx is clear and moist.   Eyes: Pupils are equal, round, and reactive to light. Right eye exhibits no discharge. Left eye exhibits no discharge. No scleral icterus.   Neck: Normal range of motion. Neck supple. No tracheal deviation present. No thyromegaly present.   Cardiovascular: Normal rate, regular rhythm and intact distal pulses.  Exam reveals gallop. Exam reveals no friction rub.    No murmur heard.  Abdominal: Soft. Bowel sounds are normal. She exhibits no distension and no mass. There is no tenderness. There is no rebound and no guarding.   Musculoskeletal: She exhibits tenderness. She exhibits no edema or deformity.   Neurological: She is alert and oriented to person, place, and time. No cranial nerve deficit. Coordination abnormal.   Skin: Skin is warm and dry. She is not diaphoretic.   Psychiatric: She has a normal mood and affect. Her behavior is normal.   Nursing note and vitals reviewed.    Assessment/Plan   Aimee was seen today for follow-up and cough.    Diagnoses and all orders for this visit:    Acute URI    Osteoarthritis of multiple joints, unspecified osteoarthritis type    Abnormal weight loss    Other orders  -     azithromycin (ZITHROMAX) 250 MG tablet; Take 2 tablets the first day, then 1 tablet daily for 4 days.  -     oxyCODONE-acetaminophen (PERCOCET)  MG per tablet; Take 1 tablet by mouth Every 6 (Six) Hours As Needed for Moderate Pain .    Continue with the Percocet for pain as above, and her daughter is dispensing them as needed.  Given her age and overall frail state and general health conditions she is not a candidate for NSAIDs.    Zithromax given for the upper respiratory symptoms, contact me if not improving within 3-5 days     We're unable to weigh her today as she was still weak and in a wheelchair and she cannot stand independently but she and her daughter feel like she is gaining some weight.  Continue with current  diet and supplements and recheck in 2 months    The patient has read and signed the [facility] Controlled Substance Contract.  I will continue to see patient for regular follow up appointments. Patient is well controlled on the medication.  BEN has been reviewed by me and is updated every 3 months. The patient is aware of the potential for addiction and dependence.           This document has been electronically signed by Dixie Tompkins MD on January 9, 2018 12:40 PM

## 2018-01-24 RX ORDER — ONDANSETRON 4 MG/1
TABLET, FILM COATED ORAL
Qty: 20 TABLET | Refills: 1 | Status: SHIPPED | OUTPATIENT
Start: 2018-01-24 | End: 2018-03-14 | Stop reason: SDUPTHER

## 2018-01-30 RX ORDER — LISINOPRIL 10 MG/1
10 TABLET ORAL DAILY
Qty: 90 TABLET | Refills: 1 | Status: SHIPPED | OUTPATIENT
Start: 2018-01-30 | End: 2018-06-11 | Stop reason: SDUPTHER

## 2018-01-30 RX ORDER — ALBUTEROL SULFATE 90 UG/1
2 AEROSOL, METERED RESPIRATORY (INHALATION) EVERY 6 HOURS PRN
Qty: 1 INHALER | Refills: 3 | Status: SHIPPED | OUTPATIENT
Start: 2018-01-30 | End: 2018-01-31 | Stop reason: SDUPTHER

## 2018-01-30 RX ORDER — OXYCODONE AND ACETAMINOPHEN 10; 325 MG/1; MG/1
1 TABLET ORAL EVERY 6 HOURS PRN
Qty: 120 TABLET | Refills: 0 | Status: SHIPPED | OUTPATIENT
Start: 2018-01-30 | End: 2018-03-14 | Stop reason: SDUPTHER

## 2018-01-31 RX ORDER — ALBUTEROL SULFATE 90 UG/1
2 AEROSOL, METERED RESPIRATORY (INHALATION) EVERY 6 HOURS PRN
Qty: 1 INHALER | Refills: 3 | Status: SHIPPED | OUTPATIENT
Start: 2018-01-31 | End: 2018-05-18 | Stop reason: SDUPTHER

## 2018-02-12 RX ORDER — ESCITALOPRAM OXALATE 10 MG/1
TABLET ORAL
Qty: 30 TABLET | Refills: 5 | Status: SHIPPED | OUTPATIENT
Start: 2018-02-12 | End: 2018-03-14 | Stop reason: SDUPTHER

## 2018-03-02 RX ORDER — RANITIDINE 150 MG/1
TABLET ORAL
Qty: 30 TABLET | Refills: 5 | Status: SHIPPED | OUTPATIENT
Start: 2018-03-02 | End: 2018-03-14 | Stop reason: SDUPTHER

## 2018-03-08 RX ORDER — DONEPEZIL HYDROCHLORIDE 5 MG/1
TABLET, FILM COATED ORAL
Qty: 30 TABLET | Refills: 5 | Status: SHIPPED | OUTPATIENT
Start: 2018-03-08 | End: 2018-08-10 | Stop reason: SDUPTHER

## 2018-03-12 RX ORDER — AMITRIPTYLINE HYDROCHLORIDE 10 MG/1
TABLET, FILM COATED ORAL
Qty: 30 TABLET | Refills: 5 | Status: SHIPPED | OUTPATIENT
Start: 2018-03-12 | End: 2018-09-05 | Stop reason: SDUPTHER

## 2018-03-14 RX ORDER — ESCITALOPRAM OXALATE 10 MG/1
10 TABLET ORAL
Qty: 30 TABLET | Refills: 5 | Status: SHIPPED | OUTPATIENT
Start: 2018-03-14 | End: 2018-08-20 | Stop reason: SDUPTHER

## 2018-03-14 RX ORDER — ONDANSETRON 4 MG/1
4 TABLET, FILM COATED ORAL EVERY 8 HOURS PRN
Qty: 20 TABLET | Refills: 1 | Status: SHIPPED | OUTPATIENT
Start: 2018-03-14 | End: 2018-04-11 | Stop reason: SDUPTHER

## 2018-03-14 RX ORDER — PROMETHAZINE HYDROCHLORIDE 25 MG/1
25 TABLET ORAL EVERY 6 HOURS PRN
Qty: 56 TABLET | Refills: 1 | Status: SHIPPED | OUTPATIENT
Start: 2018-03-14 | End: 2018-08-17 | Stop reason: SDUPTHER

## 2018-03-14 RX ORDER — OXYCODONE AND ACETAMINOPHEN 10; 325 MG/1; MG/1
1 TABLET ORAL EVERY 6 HOURS PRN
Qty: 120 TABLET | Refills: 0 | Status: SHIPPED | OUTPATIENT
Start: 2018-03-14 | End: 2018-04-18 | Stop reason: SDUPTHER

## 2018-03-14 RX ORDER — RANITIDINE 150 MG/1
150 TABLET ORAL EVERY EVENING
Qty: 30 TABLET | Refills: 5 | Status: SHIPPED | OUTPATIENT
Start: 2018-03-14 | End: 2019-01-01 | Stop reason: SDUPTHER

## 2018-04-11 RX ORDER — ONDANSETRON 4 MG/1
TABLET, FILM COATED ORAL
Qty: 20 TABLET | Refills: 1 | Status: SHIPPED | OUTPATIENT
Start: 2018-04-11 | End: 2018-06-14 | Stop reason: SDUPTHER

## 2018-04-18 ENCOUNTER — TELEPHONE (OUTPATIENT)
Dept: FAMILY MEDICINE CLINIC | Facility: CLINIC | Age: 83
End: 2018-04-18

## 2018-04-18 RX ORDER — OXYCODONE AND ACETAMINOPHEN 10; 325 MG/1; MG/1
1 TABLET ORAL EVERY 6 HOURS PRN
Qty: 120 TABLET | Refills: 0 | Status: SHIPPED | OUTPATIENT
Start: 2018-04-18 | End: 2018-05-18 | Stop reason: SDUPTHER

## 2018-05-18 ENCOUNTER — OFFICE VISIT (OUTPATIENT)
Dept: FAMILY MEDICINE CLINIC | Facility: CLINIC | Age: 83
End: 2018-05-18

## 2018-05-18 VITALS
WEIGHT: 136 LBS | DIASTOLIC BLOOD PRESSURE: 60 MMHG | SYSTOLIC BLOOD PRESSURE: 110 MMHG | BODY MASS INDEX: 22.66 KG/M2 | HEIGHT: 65 IN

## 2018-05-18 DIAGNOSIS — I10 ESSENTIAL HYPERTENSION: ICD-10-CM

## 2018-05-18 DIAGNOSIS — F03.90 DEMENTIA WITHOUT BEHAVIORAL DISTURBANCE, UNSPECIFIED DEMENTIA TYPE: Primary | ICD-10-CM

## 2018-05-18 DIAGNOSIS — E78.5 HYPERLIPIDEMIA, UNSPECIFIED HYPERLIPIDEMIA TYPE: ICD-10-CM

## 2018-05-18 DIAGNOSIS — Z74.09 IMMOBILITY: ICD-10-CM

## 2018-05-18 DIAGNOSIS — D64.9 ANEMIA, UNSPECIFIED TYPE: ICD-10-CM

## 2018-05-18 DIAGNOSIS — Z91.81 AT RISK FOR FALLS: ICD-10-CM

## 2018-05-18 DIAGNOSIS — Z87.39: ICD-10-CM

## 2018-05-18 PROCEDURE — 99214 OFFICE O/P EST MOD 30 MIN: CPT | Performed by: FAMILY MEDICINE

## 2018-05-18 RX ORDER — OXYCODONE AND ACETAMINOPHEN 10; 325 MG/1; MG/1
1 TABLET ORAL EVERY 6 HOURS PRN
Qty: 120 TABLET | Refills: 0 | Status: SHIPPED | OUTPATIENT
Start: 2018-05-18 | End: 2018-06-18 | Stop reason: SDUPTHER

## 2018-05-18 RX ORDER — ALBUTEROL SULFATE 90 UG/1
2 AEROSOL, METERED RESPIRATORY (INHALATION) EVERY 6 HOURS PRN
Qty: 1 INHALER | Refills: 3 | Status: SHIPPED | OUTPATIENT
Start: 2018-05-18 | End: 2019-01-01 | Stop reason: SDUPTHER

## 2018-05-18 NOTE — PROGRESS NOTES
Subjective   Aimee Gilmore is a 86 y.o. female with a history of breast cancer here today with her daughter for follow-up.  She has failure to thrive with dementia, Alzheimer's type.  They have home health for a while but are no longer receiving health services from them.  Her daughter said the patient which is more comfortable with her doing her care.  She has been eating a bit better and has been a bit more alert.  The change in the pain medicine seem to help significantly and she says at this point it is adequately controlled.   She is due for some labs.  Has history of hypertension, hyperlipidemia, dementia osteoporosis.  She needs a wheelchair.  She's not able to get up and walk due to the overall weakness and she is at a tremendous risk for falls.   just passed away a couple weeks ago and she's understandably grieving    History of Present Illness     The following portions of the patient's history were reviewed and updated as appropriate: allergies, current medications, past family history, past medical history, past social history, past surgical history and problem list.    Review of Systems   Constitutional: Positive for activity change, appetite change and unexpected weight change.   HENT: Negative.    Respiratory: Negative.  Negative for shortness of breath.    Cardiovascular: Negative.    Gastrointestinal: Negative.    Musculoskeletal: Positive for back pain and gait problem.   Skin: Negative.    Allergic/Immunologic: Negative for immunocompromised state.   Neurological: Positive for light-headedness. Negative for dizziness, tremors, seizures and syncope.   Hematological: Negative.    Psychiatric/Behavioral: Positive for dysphoric mood. Negative for agitation, confusion and sleep disturbance. The patient is nervous/anxious.    All other systems reviewed and are negative.    Objective   Physical Exam   Constitutional: She is oriented to person, place, and time. No distress.   Very thin and  frail overall   HENT:   Head: Normocephalic and atraumatic.   Nose: Nose normal.   Mouth/Throat: Oropharynx is clear and moist.   Eyes: Pupils are equal, round, and reactive to light. Right eye exhibits no discharge. Left eye exhibits no discharge. No scleral icterus.   Neck: Normal range of motion. Neck supple. No tracheal deviation present. No thyromegaly present.   Cardiovascular: Normal rate, regular rhythm and intact distal pulses.  Exam reveals gallop. Exam reveals no friction rub.    No murmur heard.  Abdominal: Soft. Bowel sounds are normal. She exhibits no distension and no mass. There is no tenderness. There is no rebound and no guarding.   Musculoskeletal: She exhibits tenderness. She exhibits no edema or deformity.   Neurological: She is alert and oriented to person, place, and time. No cranial nerve deficit. Coordination abnormal.   Skin: Skin is warm and dry. She is not diaphoretic.   Psychiatric: She has a normal mood and affect. Her behavior is normal.   Nursing note and vitals reviewed.    Assessment/Plan   Aimee was seen today for follow-up, med refill and fatigue.    Diagnoses and all orders for this visit:    Dementia without behavioral disturbance, unspecified dementia type    At risk for falls    Immobility    Essential hypertension  -     CBC & Differential; Future  -     Comprehensive Metabolic Panel    Hyperlipidemia, unspecified hyperlipidemia type  -     Lipid Panel; Future  -     T4, Free  -     TSH    History of senile osteoporosis    Anemia, unspecified type  -     Vitamin B12 & Folate    Other orders  -     albuterol (VENTOLIN HFA) 108 (90 Base) MCG/ACT inhaler; Inhale 2 puffs Every 6 (Six) Hours As Needed for Wheezing or Shortness of Air.  -     oxyCODONE-acetaminophen (PERCOCET)  MG per tablet; Take 1 tablet by mouth Every 6 (Six) Hours As Needed for Moderate Pain .    Continue with the Percocet for pain as above    She'll return fasting for labs as above for hypertension and  hyperlipidemia and anemia.  In the meantime continue her current medications    Wheelchair is prescribed so that her family may take her out and needed as she is too weak to walk.    We're unable to weigh her today as she was still weak and in a wheelchair and she cannot stand independently the feel like she is gaining some weight.  Continue with current diet and supplements and recheck in 2 months    The patient has read and signed the [facility] Controlled Substance Contract.  I will continue to see patient for regular follow up appointments. Patient is well controlled on the medication.  BEN has been reviewed by me and is updated every 3 months. The patient is aware of the potential for addiction and dependence.           This document has been electronically signed by Dixie Tompkins MD on May 18, 2018 1:57 PM

## 2018-06-11 RX ORDER — LISINOPRIL 10 MG/1
TABLET ORAL
Qty: 90 TABLET | Refills: 1 | Status: SHIPPED | OUTPATIENT
Start: 2018-06-11 | End: 2018-08-14 | Stop reason: SDUPTHER

## 2018-06-14 RX ORDER — ONDANSETRON 4 MG/1
TABLET, FILM COATED ORAL
Qty: 20 TABLET | Refills: 1 | Status: SHIPPED | OUTPATIENT
Start: 2018-06-14 | End: 2018-07-11 | Stop reason: SDUPTHER

## 2018-06-18 RX ORDER — OXYCODONE AND ACETAMINOPHEN 10; 325 MG/1; MG/1
1 TABLET ORAL EVERY 6 HOURS PRN
Qty: 120 TABLET | Refills: 0 | Status: SHIPPED | OUTPATIENT
Start: 2018-06-18 | End: 2018-07-18 | Stop reason: SDUPTHER

## 2018-07-11 RX ORDER — ONDANSETRON 4 MG/1
TABLET, FILM COATED ORAL
Qty: 20 TABLET | Refills: 1 | Status: SHIPPED | OUTPATIENT
Start: 2018-07-11 | End: 2018-09-05 | Stop reason: SDUPTHER

## 2018-07-18 RX ORDER — OXYCODONE AND ACETAMINOPHEN 10; 325 MG/1; MG/1
1 TABLET ORAL EVERY 6 HOURS PRN
Qty: 120 TABLET | Refills: 0 | Status: SHIPPED | OUTPATIENT
Start: 2018-07-18 | End: 2018-08-17 | Stop reason: SDUPTHER

## 2018-08-10 RX ORDER — DONEPEZIL HYDROCHLORIDE 5 MG/1
TABLET, FILM COATED ORAL
Qty: 30 TABLET | Refills: 0 | Status: SHIPPED | OUTPATIENT
Start: 2018-08-10 | End: 2018-08-17 | Stop reason: SDUPTHER

## 2018-08-14 RX ORDER — LISINOPRIL 10 MG/1
TABLET ORAL
Qty: 90 TABLET | Refills: 1 | Status: SHIPPED | OUTPATIENT
Start: 2018-08-14 | End: 2018-08-17 | Stop reason: SDUPTHER

## 2018-08-17 ENCOUNTER — LAB (OUTPATIENT)
Dept: LAB | Facility: OTHER | Age: 83
End: 2018-08-17

## 2018-08-17 ENCOUNTER — OFFICE VISIT (OUTPATIENT)
Dept: FAMILY MEDICINE CLINIC | Facility: CLINIC | Age: 83
End: 2018-08-17

## 2018-08-17 VITALS — HEIGHT: 65 IN | DIASTOLIC BLOOD PRESSURE: 78 MMHG | SYSTOLIC BLOOD PRESSURE: 114 MMHG

## 2018-08-17 DIAGNOSIS — I10 ESSENTIAL HYPERTENSION: ICD-10-CM

## 2018-08-17 DIAGNOSIS — F03.90 DEMENTIA WITHOUT BEHAVIORAL DISTURBANCE, UNSPECIFIED DEMENTIA TYPE: ICD-10-CM

## 2018-08-17 DIAGNOSIS — M15.9 OSTEOARTHRITIS OF MULTIPLE JOINTS, UNSPECIFIED OSTEOARTHRITIS TYPE: Primary | ICD-10-CM

## 2018-08-17 DIAGNOSIS — Z74.09 IMMOBILITY: ICD-10-CM

## 2018-08-17 DIAGNOSIS — E78.5 HYPERLIPIDEMIA, UNSPECIFIED HYPERLIPIDEMIA TYPE: ICD-10-CM

## 2018-08-17 LAB
ALBUMIN SERPL-MCNC: 4.5 G/DL (ref 3.5–5)
ALBUMIN/GLOB SERPL: 1.1 G/DL (ref 1.1–1.8)
ALP SERPL-CCNC: 85 U/L (ref 38–126)
ALT SERPL W P-5'-P-CCNC: 11 U/L
ANION GAP SERPL CALCULATED.3IONS-SCNC: 10 MMOL/L (ref 5–15)
AST SERPL-CCNC: 27 U/L (ref 14–36)
BASOPHILS # BLD AUTO: 0.01 10*3/MM3 (ref 0–0.2)
BASOPHILS NFR BLD AUTO: 0.2 % (ref 0–2)
BILIRUB SERPL-MCNC: 0.4 MG/DL (ref 0.2–1.3)
BUN BLD-MCNC: 21 MG/DL (ref 7–17)
BUN/CREAT SERPL: 18.9 (ref 7–25)
CALCIUM SPEC-SCNC: 10.1 MG/DL (ref 8.4–10.2)
CHLORIDE SERPL-SCNC: 107 MMOL/L (ref 98–107)
CHOLEST SERPL-MCNC: 169 MG/DL (ref 150–200)
CO2 SERPL-SCNC: 25 MMOL/L (ref 22–30)
CREAT BLD-MCNC: 1.11 MG/DL (ref 0.52–1.04)
DEPRECATED RDW RBC AUTO: 58.9 FL (ref 36.4–46.3)
EOSINOPHIL # BLD AUTO: 0.06 10*3/MM3 (ref 0–0.7)
EOSINOPHIL NFR BLD AUTO: 1.1 % (ref 0–7)
ERYTHROCYTE [DISTWIDTH] IN BLOOD BY AUTOMATED COUNT: 19.7 % (ref 11.5–14.5)
FOLATE SERPL-MCNC: >20 NG/ML (ref 2.76–21)
GFR SERPL CREATININE-BSD FRML MDRD: 47 ML/MIN/1.73 (ref 39–90)
GLOBULIN UR ELPH-MCNC: 4 GM/DL (ref 2.3–3.5)
GLUCOSE BLD-MCNC: 121 MG/DL (ref 74–99)
HCT VFR BLD AUTO: 33.5 % (ref 35–45)
HDLC SERPL-MCNC: 48 MG/DL (ref 40–59)
HGB BLD-MCNC: 10 G/DL (ref 12–15.5)
LDLC SERPL CALC-MCNC: 99 MG/DL
LDLC/HDLC SERPL: 2.05 {RATIO} (ref 0–3.22)
LYMPHOCYTES # BLD AUTO: 1.39 10*3/MM3 (ref 0.6–4.2)
LYMPHOCYTES NFR BLD AUTO: 25.3 % (ref 10–50)
MCH RBC QN AUTO: 24.9 PG (ref 26.5–34)
MCHC RBC AUTO-ENTMCNC: 29.9 G/DL (ref 31.4–36)
MCV RBC AUTO: 83.3 FL (ref 80–98)
MONOCYTES # BLD AUTO: 0.57 10*3/MM3 (ref 0–0.9)
MONOCYTES NFR BLD AUTO: 10.4 % (ref 0–12)
NEUTROPHILS # BLD AUTO: 3.46 10*3/MM3 (ref 2–8.6)
NEUTROPHILS NFR BLD AUTO: 63 % (ref 37–80)
PLATELET # BLD AUTO: 271 10*3/MM3 (ref 150–450)
PMV BLD AUTO: 10.3 FL (ref 8–12)
POTASSIUM BLD-SCNC: 4.5 MMOL/L (ref 3.4–5)
PROT SERPL-MCNC: 8.5 G/DL (ref 6.3–8.2)
RBC # BLD AUTO: 4.02 10*6/MM3 (ref 3.77–5.16)
SODIUM BLD-SCNC: 142 MMOL/L (ref 137–145)
T4 FREE SERPL-MCNC: 1.11 NG/DL (ref 0.78–2.19)
TRIGL SERPL-MCNC: 112 MG/DL
TSH SERPL DL<=0.05 MIU/L-ACNC: 0.88 MIU/ML (ref 0.46–4.68)
VIT B12 BLD-MCNC: 186 PG/ML (ref 239–931)
VLDLC SERPL-MCNC: 22.4 MG/DL
WBC NRBC COR # BLD: 5.49 10*3/MM3 (ref 3.2–9.8)

## 2018-08-17 PROCEDURE — 82746 ASSAY OF FOLIC ACID SERUM: CPT | Performed by: FAMILY MEDICINE

## 2018-08-17 PROCEDURE — 85025 COMPLETE CBC W/AUTO DIFF WBC: CPT | Performed by: FAMILY MEDICINE

## 2018-08-17 PROCEDURE — 80061 LIPID PANEL: CPT | Performed by: FAMILY MEDICINE

## 2018-08-17 PROCEDURE — 36415 COLL VENOUS BLD VENIPUNCTURE: CPT | Performed by: FAMILY MEDICINE

## 2018-08-17 PROCEDURE — 84439 ASSAY OF FREE THYROXINE: CPT | Performed by: FAMILY MEDICINE

## 2018-08-17 PROCEDURE — 82607 VITAMIN B-12: CPT | Performed by: FAMILY MEDICINE

## 2018-08-17 PROCEDURE — 84443 ASSAY THYROID STIM HORMONE: CPT | Performed by: FAMILY MEDICINE

## 2018-08-17 PROCEDURE — 80053 COMPREHEN METABOLIC PANEL: CPT | Performed by: FAMILY MEDICINE

## 2018-08-17 PROCEDURE — 99214 OFFICE O/P EST MOD 30 MIN: CPT | Performed by: FAMILY MEDICINE

## 2018-08-17 RX ORDER — DONEPEZIL HYDROCHLORIDE 5 MG/1
5 TABLET, FILM COATED ORAL EVERY EVENING
Qty: 30 TABLET | Refills: 5 | Status: SHIPPED | OUTPATIENT
Start: 2018-08-17 | End: 2019-01-01 | Stop reason: SDUPTHER

## 2018-08-17 RX ORDER — PROMETHAZINE HYDROCHLORIDE 25 MG/1
25 TABLET ORAL EVERY 6 HOURS PRN
Qty: 56 TABLET | Refills: 1 | Status: SHIPPED | OUTPATIENT
Start: 2018-08-17 | End: 2019-01-01 | Stop reason: SDUPTHER

## 2018-08-17 RX ORDER — OXYCODONE AND ACETAMINOPHEN 10; 325 MG/1; MG/1
1 TABLET ORAL EVERY 6 HOURS PRN
Qty: 120 TABLET | Refills: 0 | Status: SHIPPED | OUTPATIENT
Start: 2018-08-17 | End: 2018-09-17 | Stop reason: SDUPTHER

## 2018-08-17 RX ORDER — LISINOPRIL 10 MG/1
10 TABLET ORAL 2 TIMES DAILY
Qty: 90 TABLET | Refills: 5 | Status: SHIPPED | OUTPATIENT
Start: 2018-08-17 | End: 2019-02-25 | Stop reason: SDUPTHER

## 2018-08-17 NOTE — PROGRESS NOTES
Please call the patient regarding her abnormal result.  Talk to her daughter.  She is anemic but this is chronic.  Will repeat labs in 3 months.  Her kidney functions are a little high--is she drinking plenty?

## 2018-08-17 NOTE — PROGRESS NOTES
Subjective   Aimee Gilmore is a 86 y.o. female with a history of breast cancer here today with her daughter for follow-up.  She's had failure to thrive, Alzheimer's dementia, severe arthritis.  She is eating a bit better.  Understandably still bereaved over the death of her  just a few months ago.  She is wheelchair bound and nonambulatory.  Needs refills of meds for dementia, hypertension.    History of Present Illness   Arthritis   Presents for follow-up visit. Complains of pain, stiffness and joint swelling, reports no joint warmth. The symptoms have been worsening. Affected location: diffuse. Pain is at a severity of 8/10. Associated symptoms include fatigue, pain at night and pain while resting. Pertinent negatives include no diarrhea, dry eyes, dry mouth, dysuria, fever, rash, Raynaud's syndrome, uveitis or weight loss. Compliance with total regimen is %. Compliance with medications is %.     The following portions of the patient's history were reviewed and updated as appropriate: allergies, current medications, past family history, past medical history, past social history, past surgical history and problem list.    Review of Systems   Constitutional: Positive for activity change, appetite change and unexpected weight change.   HENT: Negative.    Respiratory: Negative.  Negative for shortness of breath.    Cardiovascular: Negative.    Gastrointestinal: Negative.    Musculoskeletal: Positive for back pain and gait problem.   Skin: Negative.    Allergic/Immunologic: Negative for immunocompromised state.   Neurological: Positive for light-headedness. Negative for dizziness, tremors, seizures and syncope.   Hematological: Negative.    Psychiatric/Behavioral: Positive for dysphoric mood. Negative for agitation, confusion and sleep disturbance. The patient is nervous/anxious.    All other systems reviewed and are negative.    Objective   Physical Exam   Constitutional: She is oriented to  person, place, and time. No distress.   Very thin and frail overall   HENT:   Head: Normocephalic and atraumatic.   Nose: Nose normal.   Mouth/Throat: Oropharynx is clear and moist.   Eyes: Pupils are equal, round, and reactive to light. Right eye exhibits no discharge. Left eye exhibits no discharge. No scleral icterus.   Neck: Normal range of motion. Neck supple. No tracheal deviation present. No thyromegaly present.   Cardiovascular: Normal rate, regular rhythm and intact distal pulses.  Exam reveals gallop. Exam reveals no friction rub.    No murmur heard.  Abdominal: Soft. Bowel sounds are normal. She exhibits no distension and no mass. There is no tenderness. There is no rebound and no guarding.   Musculoskeletal: She exhibits tenderness. She exhibits no edema or deformity.   Neurological: She is alert and oriented to person, place, and time. No cranial nerve deficit. Coordination abnormal.   Skin: Skin is warm and dry. She is not diaphoretic.   Psychiatric: She has a normal mood and affect. Her behavior is normal.   Nursing note and vitals reviewed.    Assessment/Plan   Aimee was seen today for follow-up and med refill.    Diagnoses and all orders for this visit:    Osteoarthritis of multiple joints, unspecified osteoarthritis type    Immobility    Essential hypertension    Dementia without behavioral disturbance, unspecified dementia type    Other orders  -     lisinopril (PRINIVIL,ZESTRIL) 10 MG tablet; Take 1 tablet by mouth 2 (Two) Times a Day. Take 2 tablets in am and 1 tablet at bedtime  -     promethazine (PHENERGAN) 25 MG tablet; Take 1 tablet by mouth Every 6 (Six) Hours As Needed for Nausea or Vomiting.  -     donepezil (ARICEPT) 5 MG tablet; Take 1 tablet by mouth Every Evening.  -     oxyCODONE-acetaminophen (PERCOCET)  MG per tablet; Take 1 tablet by mouth Every 6 (Six) Hours As Needed for Moderate Pain .    Continue with the Percocet for pain as above in this patient who is not a candidate  for NSAIDs.    Continue Aricept for dementia    Continue lisinopril for hypertension    The patient has read and signed the [facility].  I will continue to see patient for regular follow up appointments. Patient is well controlled on the medication.  BEN has been reviewed by me and is updated every 3 months. The patient is aware of the potential for addiction and dependence.           This document has been electronically signed by Dixie Tompkins MD on August 17, 2018 10:11 AM

## 2018-08-20 RX ORDER — ESCITALOPRAM OXALATE 10 MG/1
TABLET ORAL
Qty: 30 TABLET | Refills: 5 | Status: SHIPPED | OUTPATIENT
Start: 2018-08-20 | End: 2019-01-01 | Stop reason: SDUPTHER

## 2018-08-20 RX ORDER — ESCITALOPRAM OXALATE 10 MG/1
10 TABLET ORAL
Qty: 30 TABLET | Refills: 5 | Status: SHIPPED | OUTPATIENT
Start: 2018-08-20 | End: 2019-02-15 | Stop reason: SDUPTHER

## 2018-08-24 RX ORDER — RANITIDINE 150 MG/1
TABLET ORAL
Qty: 30 TABLET | Refills: 5 | Status: SHIPPED | OUTPATIENT
Start: 2018-08-24 | End: 2019-02-15 | Stop reason: SDUPTHER

## 2018-09-06 RX ORDER — ONDANSETRON 4 MG/1
TABLET, FILM COATED ORAL
Qty: 20 TABLET | Refills: 1 | Status: SHIPPED | OUTPATIENT
Start: 2018-09-06 | End: 2019-01-01 | Stop reason: SDUPTHER

## 2018-09-06 RX ORDER — AMITRIPTYLINE HYDROCHLORIDE 10 MG/1
TABLET, FILM COATED ORAL
Qty: 30 TABLET | Refills: 5 | Status: SHIPPED | OUTPATIENT
Start: 2018-09-06 | End: 2019-02-25 | Stop reason: SDUPTHER

## 2018-09-17 RX ORDER — OXYCODONE AND ACETAMINOPHEN 10; 325 MG/1; MG/1
1 TABLET ORAL EVERY 6 HOURS PRN
Qty: 120 TABLET | Refills: 0 | Status: SHIPPED | OUTPATIENT
Start: 2018-09-17 | End: 2018-10-17 | Stop reason: SDUPTHER

## 2018-10-17 ENCOUNTER — TELEPHONE (OUTPATIENT)
Dept: FAMILY MEDICINE CLINIC | Facility: CLINIC | Age: 83
End: 2018-10-17

## 2018-10-17 RX ORDER — OXYCODONE AND ACETAMINOPHEN 10; 325 MG/1; MG/1
1 TABLET ORAL EVERY 6 HOURS PRN
Qty: 120 TABLET | Refills: 0 | Status: SHIPPED | OUTPATIENT
Start: 2018-10-17 | End: 2018-11-16 | Stop reason: SDUPTHER

## 2018-11-16 ENCOUNTER — OFFICE VISIT (OUTPATIENT)
Dept: FAMILY MEDICINE CLINIC | Facility: CLINIC | Age: 83
End: 2018-11-16

## 2018-11-16 VITALS — HEIGHT: 65 IN | BODY MASS INDEX: 22.98 KG/M2 | DIASTOLIC BLOOD PRESSURE: 70 MMHG | SYSTOLIC BLOOD PRESSURE: 112 MMHG

## 2018-11-16 DIAGNOSIS — Z85.3 HISTORY OF BREAST CANCER: ICD-10-CM

## 2018-11-16 DIAGNOSIS — M15.9 OSTEOARTHRITIS OF MULTIPLE JOINTS, UNSPECIFIED OSTEOARTHRITIS TYPE: Primary | ICD-10-CM

## 2018-11-16 DIAGNOSIS — R62.7 FAILURE TO THRIVE IN ADULT: ICD-10-CM

## 2018-11-16 PROCEDURE — 99214 OFFICE O/P EST MOD 30 MIN: CPT | Performed by: FAMILY MEDICINE

## 2018-11-16 RX ORDER — OXYCODONE AND ACETAMINOPHEN 10; 325 MG/1; MG/1
1 TABLET ORAL EVERY 6 HOURS PRN
Qty: 120 TABLET | Refills: 0 | Status: SHIPPED | OUTPATIENT
Start: 2018-11-16 | End: 2018-12-14 | Stop reason: SDUPTHER

## 2018-11-16 NOTE — PROGRESS NOTES
Subjective   Aimee Gilmore is a 86 y.o. female with a history of breast cancer here today with her son for follow-up.  She is eating better and has gained a small amount of weight back.  We are unable to weigh her today and she is in a wheelchair and very weak.  She does occasionally ambulate at home with a walker and assistance.  She needs refills of her medications including the pain medication.  She does have significant arthritis and is extremely frail.  Given her age and other health conditions she is not an NSAID candidate.    History of Present Illness   Arthritis   Presents for follow-up visit. Complains of pain, stiffness and joint swelling, reports no joint warmth. The symptoms have been worsening. Affected location: diffuse. Pain is at a severity of 8/10. Associated symptoms include fatigue, pain at night and pain while resting. Pertinent negatives include no diarrhea, dry eyes, dry mouth, dysuria, fever, rash, Raynaud's syndrome, uveitis or weight loss. Compliance with total regimen is %. Compliance with medications is %.     The following portions of the patient's history were reviewed and updated as appropriate: allergies, current medications, past family history, past medical history, past social history, past surgical history and problem list.    Review of Systems   Constitutional: Positive for activity change, appetite change and unexpected weight change.   HENT: Negative.    Respiratory: Negative.  Negative for shortness of breath.    Cardiovascular: Negative.    Gastrointestinal: Negative.    Musculoskeletal: Positive for back pain and gait problem.   Skin: Negative.    Allergic/Immunologic: Negative for immunocompromised state.   Neurological: Positive for light-headedness. Negative for dizziness, tremors, seizures and syncope.   Hematological: Negative.    Psychiatric/Behavioral: Positive for dysphoric mood. Negative for agitation, confusion and sleep disturbance. The patient  is nervous/anxious.    All other systems reviewed and are negative.    Objective   Physical Exam   Constitutional: She is oriented to person, place, and time. No distress.   Very thin and frail overall   HENT:   Head: Normocephalic and atraumatic.   Nose: Nose normal.   Mouth/Throat: Oropharynx is clear and moist.   Eyes: Pupils are equal, round, and reactive to light. Right eye exhibits no discharge. Left eye exhibits no discharge. No scleral icterus.   Neck: Normal range of motion. Neck supple. No tracheal deviation present. No thyromegaly present.   Cardiovascular: Normal rate, regular rhythm and intact distal pulses. Exam reveals gallop. Exam reveals no friction rub.   No murmur heard.  Abdominal: Soft. Bowel sounds are normal. She exhibits no distension and no mass. There is no tenderness. There is no rebound and no guarding.   Musculoskeletal: She exhibits tenderness. She exhibits no edema or deformity.   Neurological: She is alert and oriented to person, place, and time. No cranial nerve deficit. Coordination abnormal.   Skin: Skin is warm and dry. She is not diaphoretic.   Psychiatric: She has a normal mood and affect. Her behavior is normal.   Nursing note and vitals reviewed.    Assessment/Plan   Aimee was seen today for med refill.    Diagnoses and all orders for this visit:    Osteoarthritis of multiple joints, unspecified osteoarthritis type    Failure to thrive in adult    History of breast cancer    Other orders  -     oxyCODONE-acetaminophen (PERCOCET)  MG per tablet; Take 1 tablet by mouth Every 6 (Six) Hours As Needed for Moderate Pain .    Continue with the Percocet for pain as above in this patient who is not a candidate for NSAIDs.  She is regaining some of her weight back and eating better.  Her family's been trying very hard to encourage her with her diet and is caring for her full-time at home.  The patient has read and signed the [facility] Controlled Substance Contract.  I will  continue to see patient for regular follow up appointments. Patient is well controlled on the medication.  BEN has been reviewed by me and is updated every 3 months. The patient is aware of the potential for addiction and dependence.           This document has been electronically signed by Dixie Tompkins MD on November 16, 2018 9:28 AM

## 2018-12-14 RX ORDER — OXYCODONE AND ACETAMINOPHEN 10; 325 MG/1; MG/1
1 TABLET ORAL EVERY 6 HOURS PRN
Qty: 120 TABLET | Refills: 0 | Status: SHIPPED | OUTPATIENT
Start: 2018-12-14 | End: 2019-01-11 | Stop reason: SDUPTHER

## 2019-01-01 ENCOUNTER — OFFICE VISIT (OUTPATIENT)
Dept: FAMILY MEDICINE CLINIC | Facility: CLINIC | Age: 84
End: 2019-01-01

## 2019-01-01 VITALS — SYSTOLIC BLOOD PRESSURE: 118 MMHG | HEIGHT: 65 IN | BODY MASS INDEX: 22.98 KG/M2 | DIASTOLIC BLOOD PRESSURE: 64 MMHG

## 2019-01-01 VITALS — DIASTOLIC BLOOD PRESSURE: 62 MMHG | SYSTOLIC BLOOD PRESSURE: 118 MMHG

## 2019-01-01 DIAGNOSIS — G89.29 CHRONIC PAIN OF MULTIPLE JOINTS: Primary | ICD-10-CM

## 2019-01-01 DIAGNOSIS — M15.9 OSTEOARTHRITIS OF MULTIPLE JOINTS, UNSPECIFIED OSTEOARTHRITIS TYPE: ICD-10-CM

## 2019-01-01 DIAGNOSIS — E78.5 HYPERLIPIDEMIA, UNSPECIFIED HYPERLIPIDEMIA TYPE: Primary | ICD-10-CM

## 2019-01-01 DIAGNOSIS — I10 ESSENTIAL HYPERTENSION: ICD-10-CM

## 2019-01-01 DIAGNOSIS — C50.919 PRIMARY MALIGNANT NEOPLASM OF BREAST, UNSPECIFIED LATERALITY (HCC): ICD-10-CM

## 2019-01-01 DIAGNOSIS — M25.50 CHRONIC PAIN OF MULTIPLE JOINTS: Primary | ICD-10-CM

## 2019-01-01 DIAGNOSIS — M15.9 OSTEOARTHRITIS OF MULTIPLE JOINTS, UNSPECIFIED OSTEOARTHRITIS TYPE: Primary | ICD-10-CM

## 2019-01-01 DIAGNOSIS — R62.7 FAILURE TO THRIVE IN ADULT: ICD-10-CM

## 2019-01-01 PROCEDURE — 99214 OFFICE O/P EST MOD 30 MIN: CPT | Performed by: FAMILY MEDICINE

## 2019-01-01 RX ORDER — RANITIDINE 150 MG/1
150 TABLET ORAL EVERY EVENING
Qty: 30 TABLET | Refills: 5 | Status: SHIPPED | OUTPATIENT
Start: 2019-01-01 | End: 2020-01-01 | Stop reason: ALTCHOICE

## 2019-01-01 RX ORDER — OXYCODONE AND ACETAMINOPHEN 10; 325 MG/1; MG/1
1 TABLET ORAL EVERY 6 HOURS PRN
Qty: 120 TABLET | Refills: 0 | Status: SHIPPED | OUTPATIENT
Start: 2019-01-01 | End: 2019-01-01 | Stop reason: SDUPTHER

## 2019-01-01 RX ORDER — LISINOPRIL 10 MG/1
TABLET ORAL
Qty: 90 TABLET | Refills: 5 | Status: SHIPPED | OUTPATIENT
Start: 2019-01-01 | End: 2020-01-01 | Stop reason: SDUPTHER

## 2019-01-01 RX ORDER — LISINOPRIL 10 MG/1
10 TABLET ORAL DAILY
Qty: 90 TABLET | Refills: 5 | Status: SHIPPED | OUTPATIENT
Start: 2019-01-01 | End: 2020-01-01 | Stop reason: SDUPTHER

## 2019-01-01 RX ORDER — ESCITALOPRAM OXALATE 10 MG/1
10 TABLET ORAL
Qty: 30 TABLET | Refills: 5 | Status: SHIPPED | OUTPATIENT
Start: 2019-01-01 | End: 2020-01-01 | Stop reason: SDUPTHER

## 2019-01-01 RX ORDER — RISPERIDONE 0.5 MG/1
0.5 TABLET ORAL DAILY
Qty: 30 TABLET | Refills: 5 | Status: SHIPPED | OUTPATIENT
Start: 2019-01-01 | End: 2020-01-01 | Stop reason: SDUPTHER

## 2019-01-01 RX ORDER — PROMETHAZINE HYDROCHLORIDE 25 MG/1
25 TABLET ORAL EVERY 6 HOURS PRN
Qty: 56 TABLET | Refills: 1 | Status: SHIPPED | OUTPATIENT
Start: 2019-01-01

## 2019-01-01 RX ORDER — DONEPEZIL HYDROCHLORIDE 5 MG/1
5 TABLET, FILM COATED ORAL EVERY EVENING
Qty: 30 TABLET | Refills: 5 | Status: SHIPPED | OUTPATIENT
Start: 2019-01-01 | End: 2020-01-01 | Stop reason: SDUPTHER

## 2019-01-01 RX ORDER — AMITRIPTYLINE HYDROCHLORIDE 10 MG/1
10 TABLET, FILM COATED ORAL EVERY EVENING
Qty: 30 TABLET | Refills: 5 | Status: SHIPPED | OUTPATIENT
Start: 2019-01-01 | End: 2020-01-01 | Stop reason: SDUPTHER

## 2019-01-01 RX ORDER — OXYCODONE AND ACETAMINOPHEN 10; 325 MG/1; MG/1
1 TABLET ORAL EVERY 6 HOURS PRN
Qty: 120 TABLET | Refills: 0 | Status: SHIPPED | OUTPATIENT
Start: 2019-01-01 | End: 2020-01-01 | Stop reason: SDUPTHER

## 2019-01-01 RX ORDER — ONDANSETRON 4 MG/1
TABLET, FILM COATED ORAL
Qty: 20 TABLET | Refills: 1 | Status: SHIPPED | OUTPATIENT
Start: 2019-01-01 | End: 2020-01-01 | Stop reason: SDUPTHER

## 2019-01-01 RX ORDER — AMITRIPTYLINE HYDROCHLORIDE 10 MG/1
TABLET, FILM COATED ORAL
Qty: 30 TABLET | Refills: 5 | Status: SHIPPED | OUTPATIENT
Start: 2019-01-01 | End: 2020-01-01 | Stop reason: SDUPTHER

## 2019-01-01 RX ORDER — ALBUTEROL SULFATE 90 UG/1
2 AEROSOL, METERED RESPIRATORY (INHALATION) EVERY 6 HOURS PRN
Qty: 1 INHALER | Refills: 5 | Status: SHIPPED | OUTPATIENT
Start: 2019-01-01 | End: 2019-01-01 | Stop reason: SDUPTHER

## 2019-01-01 RX ORDER — ALBUTEROL SULFATE 90 UG/1
2 AEROSOL, METERED RESPIRATORY (INHALATION) EVERY 6 HOURS PRN
Qty: 1 INHALER | Refills: 5 | Status: SHIPPED | OUTPATIENT
Start: 2019-01-01 | End: 2020-01-01 | Stop reason: SDUPTHER

## 2019-01-01 RX ORDER — DONEPEZIL HYDROCHLORIDE 5 MG/1
TABLET, FILM COATED ORAL
Qty: 30 TABLET | Refills: 5 | Status: SHIPPED | OUTPATIENT
Start: 2019-01-01 | End: 2020-01-01 | Stop reason: SDUPTHER

## 2019-01-01 RX ORDER — ONDANSETRON 4 MG/1
4 TABLET, FILM COATED ORAL EVERY 8 HOURS PRN
Qty: 20 TABLET | Refills: 1 | Status: SHIPPED | OUTPATIENT
Start: 2019-01-01 | End: 2019-01-01 | Stop reason: SDUPTHER

## 2019-01-01 RX ORDER — DONEPEZIL HYDROCHLORIDE 5 MG/1
TABLET, FILM COATED ORAL
Qty: 30 TABLET | Refills: 0 | Status: SHIPPED | OUTPATIENT
Start: 2019-01-01 | End: 2019-01-01 | Stop reason: SDUPTHER

## 2019-01-11 RX ORDER — OXYCODONE AND ACETAMINOPHEN 10; 325 MG/1; MG/1
1 TABLET ORAL EVERY 6 HOURS PRN
Qty: 120 TABLET | Refills: 0 | Status: SHIPPED | OUTPATIENT
Start: 2019-01-11 | End: 2019-02-08 | Stop reason: SDUPTHER

## 2019-01-28 RX ORDER — AZITHROMYCIN 250 MG/1
TABLET, FILM COATED ORAL
Qty: 6 TABLET | Refills: 0 | Status: SHIPPED | OUTPATIENT
Start: 2019-01-28 | End: 2019-02-15

## 2019-02-08 RX ORDER — OXYCODONE AND ACETAMINOPHEN 10; 325 MG/1; MG/1
1 TABLET ORAL EVERY 6 HOURS PRN
Qty: 20 TABLET | Refills: 0 | Status: SHIPPED | OUTPATIENT
Start: 2019-02-08 | End: 2019-02-15 | Stop reason: SDUPTHER

## 2019-02-15 ENCOUNTER — OFFICE VISIT (OUTPATIENT)
Dept: FAMILY MEDICINE CLINIC | Facility: CLINIC | Age: 84
End: 2019-02-15

## 2019-02-15 VITALS
HEIGHT: 65 IN | SYSTOLIC BLOOD PRESSURE: 120 MMHG | HEART RATE: 90 BPM | BODY MASS INDEX: 22.98 KG/M2 | DIASTOLIC BLOOD PRESSURE: 80 MMHG | OXYGEN SATURATION: 98 %

## 2019-02-15 DIAGNOSIS — S16.1XXA STRAIN OF NECK MUSCLE, INITIAL ENCOUNTER: ICD-10-CM

## 2019-02-15 DIAGNOSIS — M15.9 OSTEOARTHRITIS OF MULTIPLE JOINTS, UNSPECIFIED OSTEOARTHRITIS TYPE: ICD-10-CM

## 2019-02-15 DIAGNOSIS — M62.81 MUSCLE WEAKNESS: Primary | ICD-10-CM

## 2019-02-15 PROCEDURE — 99214 OFFICE O/P EST MOD 30 MIN: CPT | Performed by: FAMILY MEDICINE

## 2019-02-15 RX ORDER — OXYCODONE AND ACETAMINOPHEN 10; 325 MG/1; MG/1
1 TABLET ORAL EVERY 6 HOURS PRN
Qty: 120 TABLET | Refills: 0 | Status: SHIPPED | OUTPATIENT
Start: 2019-02-15 | End: 2019-03-15 | Stop reason: SDUPTHER

## 2019-02-15 NOTE — PROGRESS NOTES
Subjective   Aimee Gilmore is a 87 y.o. female with a history of breast cancer here today with her son for follow-up.  She is eating better and has gained a small amount of weight back.  We are unable to weigh her today and she is in a wheelchair and very weak.  She does occasionally ambulate at home with a walker and assistance.  She needs refills of her medications including the pain medication.  She does have significant arthritis and is extremely frail.  Given her age and other health conditions she is not an NSAID candidate.  Her daughter lives with her and helps care for her.  She notices that her mother's energy level was much better when she was on the B12 weekly and would like to go back to this.  I think this would be a good idea for her.  She also complains about some neck pain.  It started after she fell asleep in a chair with her neck bent over.     History of Present Illness   Arthritis   Presents for follow-up visit. Complains of pain, stiffness and joint swelling, reports no joint warmth. The symptoms have been worsening. Affected location: diffuse. Pain is at a severity of 8/10. Associated symptoms include fatigue, pain at night and pain while resting. Pertinent negatives include no diarrhea, dry eyes, dry mouth, dysuria, fever, rash, Raynaud's syndrome, uveitis or weight loss. Compliance with total regimen is %. Compliance with medications is %.   Neck Pain    This is a new problem.  It started a few days ago. The problem occurs constantly. The problem has been unchanged. The pain is present in the midline. The quality of the pain is described as aching and shooting. The pain is at a severity of 8/10. The pain is severe. The symptoms are aggravated by twisting, position and bending. The pain is same all the time. Stiffness is present in the morning, all day and at night. Associated symptoms include tingling. Pertinent negatives include no chest pain, fever, headaches, leg pain,  numbness, pain with swallowing, paresis, syncope, trouble swallowing, visual change, weakness or weight loss. He has tried acetaminophen, muscle relaxants and NSAIDs for the symptoms. The treatment provided mild relief.     The following portions of the patient's history were reviewed and updated as appropriate: allergies, current medications, past family history, past medical history, past social history, past surgical history and problem list.    Review of Systems   Constitutional: Positive for activity change, appetite change and unexpected weight change.   HENT: Negative.    Respiratory: Negative.  Negative for shortness of breath.    Cardiovascular: Negative.    Gastrointestinal: Negative.    Musculoskeletal: Positive for back pain and gait problem.   Skin: Negative.    Allergic/Immunologic: Negative for immunocompromised state.   Neurological: Positive for light-headedness. Negative for dizziness, tremors, seizures and syncope.   Hematological: Negative.    Psychiatric/Behavioral: Positive for dysphoric mood. Negative for agitation, confusion and sleep disturbance. The patient is nervous/anxious.    All other systems reviewed and are negative.    Objective   Physical Exam   Constitutional: She is oriented to person, place, and time. No distress.   Very thin and frail overall   HENT:   Head: Normocephalic and atraumatic.   Nose: Nose normal.   Mouth/Throat: Oropharynx is clear and moist.   Eyes: Pupils are equal, round, and reactive to light. Right eye exhibits no discharge. Left eye exhibits no discharge. No scleral icterus.   Neck: Normal range of motion. Neck supple. No tracheal deviation present. No thyromegaly present.   Cardiovascular: Normal rate, regular rhythm and intact distal pulses. Exam reveals gallop. Exam reveals no friction rub.   No murmur heard.  Abdominal: Soft. Bowel sounds are normal. She exhibits no distension and no mass. There is no tenderness. There is no rebound and no guarding.    Musculoskeletal: She exhibits tenderness. She exhibits no edema or deformity.   Neurological: She is alert and oriented to person, place, and time. No cranial nerve deficit. Coordination abnormal.   Skin: Skin is warm and dry. She is not diaphoretic.   Psychiatric: She has a normal mood and affect. Her behavior is normal.   Nursing note and vitals reviewed.    Assessment/Plan   Aimee was seen today for follow-up.    Diagnoses and all orders for this visit:    Muscle weakness    Osteoarthritis of multiple joints, unspecified osteoarthritis type    Strain of neck muscle, initial encounter    Other orders  -     oxyCODONE-acetaminophen (PERCOCET)  MG per tablet; Take 1 tablet by mouth Every 6 (Six) Hours As Needed for Moderate Pain .    Continue with the Percocet for pain as above in this patient who is not a candidate for NSAIDs.  She is regaining some of her weight back and eating better.  Her family's been trying very hard to encourage her with her diet and is caring for her full-time at home.  The patient has read and signed the [facility] Controlled Substance Contract.  I will continue to see patient for regular follow up appointments. Patient is well controlled on the medication.  BEN has been reviewed by me and is updated every 3 months. The patient is aware of the potential for addiction and dependence.   Recommended some over-the-counter liniments for the neck pain and suggested that they get a travel pillow for her neck so that she can use it when she sitting up in the chair since her neck muscles are weak.        This document has been electronically signed by Dixie Tompkins MD on February 15, 2019 10:21 AM

## 2019-02-25 RX ORDER — AMITRIPTYLINE HYDROCHLORIDE 10 MG/1
TABLET, FILM COATED ORAL
Qty: 30 TABLET | Refills: 5 | Status: SHIPPED | OUTPATIENT
Start: 2019-02-25 | End: 2019-01-01 | Stop reason: SDUPTHER

## 2019-02-25 RX ORDER — LISINOPRIL 10 MG/1
TABLET ORAL
Qty: 90 TABLET | Refills: 5 | Status: SHIPPED | OUTPATIENT
Start: 2019-02-25 | End: 2019-01-01 | Stop reason: SDUPTHER

## 2019-03-15 RX ORDER — OXYCODONE AND ACETAMINOPHEN 10; 325 MG/1; MG/1
1 TABLET ORAL EVERY 6 HOURS PRN
Qty: 120 TABLET | Refills: 0 | Status: SHIPPED | OUTPATIENT
Start: 2019-03-15 | End: 2019-04-12 | Stop reason: SDUPTHER

## 2019-03-25 RX ORDER — DONEPEZIL HYDROCHLORIDE 5 MG/1
TABLET, FILM COATED ORAL
Qty: 30 TABLET | Refills: 0 | Status: SHIPPED | OUTPATIENT
Start: 2019-03-25 | End: 2019-01-01 | Stop reason: SDUPTHER

## 2019-04-12 RX ORDER — OXYCODONE AND ACETAMINOPHEN 10; 325 MG/1; MG/1
1 TABLET ORAL EVERY 6 HOURS PRN
Qty: 120 TABLET | Refills: 0 | Status: SHIPPED | OUTPATIENT
Start: 2019-04-12 | End: 2019-01-01 | Stop reason: SDUPTHER

## 2019-05-09 NOTE — PROGRESS NOTES
Subjective   Aimee Gilmore is a 87 y.o. female with a history of breast cancer here today with her daughter for follow-up.  She lost her  last year.  She is staying with her daughter who helps with her activities of daily living.  They are happy to report that her appetite is better.  She is still too weak and unable to get up out of the wheelchair to weigh today but her daughter feels that she has gained some weight and she certainly looks as if she has.  Her appetite is much better.  She continues to have a lot of chronic arthritis pain and neck pain and would like a refill of the pain medication.     History of Present Illness   Arthritis   Presents for follow-up visit. Complains of pain, stiffness and joint swelling, reports no joint warmth. The symptoms have been worsening. Affected location: diffuse. Pain is at a severity of 8/10. Associated symptoms include fatigue, pain at night and pain while resting. Pertinent negatives include no diarrhea, dry eyes, dry mouth, dysuria, fever, rash, Raynaud's syndrome, uveitis or weight loss. Compliance with total regimen is %. Compliance with medications is %.   Neck Pain    This is a new problem.  It started a few days ago. The problem occurs constantly. The problem has been unchanged. The pain is present in the midline. The quality of the pain is described as aching and shooting. The pain is at a severity of 8/10. The pain is severe. The symptoms are aggravated by twisting, position and bending. The pain is same all the time. Stiffness is present in the morning, all day and at night. Associated symptoms include tingling. Pertinent negatives include no chest pain, fever, headaches, leg pain, numbness, pain with swallowing, paresis, syncope, trouble swallowing, visual change, weakness or weight loss. He has tried acetaminophen, muscle relaxants and NSAIDs for the symptoms. The treatment provided mild relief.     The following portions of the  patient's history were reviewed and updated as appropriate: allergies, current medications, past family history, past medical history, past social history, past surgical history and problem list.    Review of Systems   Constitutional: Positive for activity change, appetite change and unexpected weight change.   HENT: Negative.    Respiratory: Negative.  Negative for shortness of breath.    Cardiovascular: Negative.    Gastrointestinal: Negative.    Musculoskeletal: Positive for back pain and gait problem.   Skin: Negative.    Allergic/Immunologic: Negative for immunocompromised state.   Neurological: Positive for light-headedness. Negative for dizziness, tremors, seizures and syncope.   Hematological: Negative.    Psychiatric/Behavioral: Positive for dysphoric mood. Negative for agitation, confusion and sleep disturbance. The patient is nervous/anxious.    All other systems reviewed and are negative.    Objective   Physical Exam   Constitutional: She is oriented to person, place, and time. No distress.   Very thin and frail overall   HENT:   Head: Normocephalic and atraumatic.   Nose: Nose normal.   Mouth/Throat: Oropharynx is clear and moist.   Eyes: Pupils are equal, round, and reactive to light. Right eye exhibits no discharge. Left eye exhibits no discharge. No scleral icterus.   Neck: Normal range of motion. Neck supple. No tracheal deviation present. No thyromegaly present.   Cardiovascular: Normal rate, regular rhythm and intact distal pulses. Exam reveals gallop. Exam reveals no friction rub.   No murmur heard.  Abdominal: Soft. Bowel sounds are normal. She exhibits no distension and no mass. There is no tenderness. There is no rebound and no guarding.   Musculoskeletal: She exhibits tenderness. She exhibits no edema or deformity.   Neurological: She is alert and oriented to person, place, and time. No cranial nerve deficit. Coordination abnormal.   Skin: Skin is warm and dry. She is not diaphoretic.    Psychiatric: She has a normal mood and affect. Her behavior is normal.   Nursing note and vitals reviewed.    Assessment/Plan   Aimee was seen today for med refill.    Diagnoses and all orders for this visit:    Osteoarthritis of multiple joints, unspecified osteoarthritis type    Failure to thrive in adult    Primary malignant neoplasm of breast, unspecified laterality (CMS/HCC)    Other orders  -     oxyCODONE-acetaminophen (PERCOCET)  MG per tablet; Take 1 tablet by mouth Every 6 (Six) Hours As Needed for Moderate Pain .  -     albuterol sulfate HFA (VENTOLIN HFA) 108 (90 Base) MCG/ACT inhaler; Inhale 2 puffs Every 6 (Six) Hours As Needed for Wheezing or Shortness of Air.    Continue with the Percocet for pain as above in this very frail, elderly, nonambulatory patient with history of breast cancer who is not a candidate for NSAIDs.  Continue to encourage her appetite and will follow closely.  The patient has read and signed the [facility] Controlled Substance Contract.  I will continue to see patient for regular follow up appointments. Patient is well controlled on the medication.  BEN has been reviewed by me and is updated every 3 months. The patient is aware of the potential for addiction and dependence.           This document has been electronically signed by Dixie Tompkins MD on May 9, 2019 10:09 AM

## 2019-09-04 NOTE — PROGRESS NOTES
Subjective   Aimee Gilmore is a 87 y.o. female with a history of breast cancer here today with her daughter for follow-up.  She lost her  last year.  She is staying with her daughter who helps with her activities of daily living.  Her appetite is still better as of late.  She has not really lost anymore weight and she is eating normally for her.  She does need a refill of her pain medication.  Spends most of her time in a wheelchair.  She is due for some labs for lipids, hypertension.  History of Present Illness   Arthritis   Presents for follow-up visit. Complains of pain, stiffness and joint swelling, reports no joint warmth. The symptoms have been worsening. Affected location: diffuse. Pain is at a severity of 8/10. Associated symptoms include fatigue, pain at night and pain while resting. Pertinent negatives include no diarrhea, dry eyes, dry mouth, dysuria, fever, rash, Raynaud's syndrome, uveitis or weight loss. Compliance with total regimen is %. Compliance with medications is %.   Neck Pain    This is a new problem.  It started a few days ago. The problem occurs constantly. The problem has been unchanged. The pain is present in the midline. The quality of the pain is described as aching and shooting. The pain is at a severity of 8/10. The pain is severe. The symptoms are aggravated by twisting, position and bending. The pain is same all the time. Stiffness is present in the morning, all day and at night. Associated symptoms include tingling. Pertinent negatives include no chest pain, fever, headaches, leg pain, numbness, pain with swallowing, paresis, syncope, trouble swallowing, visual change, weakness or weight loss. He has tried acetaminophen, muscle relaxants and NSAIDs for the symptoms. The treatment provided mild relief.     The following portions of the patient's history were reviewed and updated as appropriate: allergies, current medications, past family history, past  medical history, past social history, past surgical history and problem list.    Review of Systems   Constitutional: Positive for activity change, appetite change and unexpected weight change.   HENT: Negative.    Respiratory: Negative.  Negative for shortness of breath.    Cardiovascular: Negative.    Gastrointestinal: Negative.    Musculoskeletal: Positive for back pain and gait problem.   Skin: Negative.    Allergic/Immunologic: Negative for immunocompromised state.   Neurological: Positive for light-headedness. Negative for dizziness, tremors, seizures and syncope.   Hematological: Negative.    Psychiatric/Behavioral: Positive for dysphoric mood. Negative for agitation, confusion and sleep disturbance. The patient is nervous/anxious.    All other systems reviewed and are negative.    Objective   Physical Exam   Constitutional: She is oriented to person, place, and time. No distress.   Very thin and frail overall   HENT:   Head: Normocephalic and atraumatic.   Nose: Nose normal.   Mouth/Throat: Oropharynx is clear and moist.   Eyes: Pupils are equal, round, and reactive to light. Right eye exhibits no discharge. Left eye exhibits no discharge. No scleral icterus.   Neck: Normal range of motion. Neck supple. No tracheal deviation present. No thyromegaly present.   Cardiovascular: Normal rate, regular rhythm and intact distal pulses. Exam reveals gallop. Exam reveals no friction rub.   No murmur heard.  Abdominal: Soft. Bowel sounds are normal. She exhibits no distension and no mass. There is no tenderness. There is no rebound and no guarding.   Musculoskeletal: She exhibits tenderness. She exhibits no edema or deformity.   Neurological: She is alert and oriented to person, place, and time. No cranial nerve deficit. Coordination abnormal.   Skin: Skin is warm and dry. She is not diaphoretic.   Psychiatric: She has a normal mood and affect. Her behavior is normal.   Nursing note and vitals  reviewed.    Assessment/Plan   Aimee was seen today for med refill.    Diagnoses and all orders for this visit:    Hyperlipidemia, unspecified hyperlipidemia type  -     Comprehensive Metabolic Panel  -     CBC & Differential; Future  -     Lipid Panel; Future  -     TSH  -     T4, Free    Essential hypertension    Osteoarthritis of multiple joints, unspecified osteoarthritis type    Other orders  -     oxyCODONE-acetaminophen (PERCOCET)  MG per tablet; Take 1 tablet by mouth Every 6 (Six) Hours As Needed for Moderate Pain .  -     risperiDONE (RISPERDAL) 0.5 MG tablet; Take 1 tablet by mouth Daily.  -     raNITIdine (ZANTAC) 150 MG tablet; Take 1 tablet by mouth Every Evening.  -     promethazine (PHENERGAN) 25 MG tablet; Take 1 tablet by mouth Every 6 (Six) Hours As Needed for Nausea or Vomiting.  -     ondansetron (ZOFRAN) 4 MG tablet; Take 1 tablet by mouth Every 8 (Eight) Hours As Needed for Nausea or Vomiting.  -     lisinopril (PRINIVIL,ZESTRIL) 10 MG tablet; Take 1 tablet by mouth Daily.  -     escitalopram (LEXAPRO) 10 MG tablet; Take 1 tablet by mouth every night at bedtime.  -     donepezil (ARICEPT) 5 MG tablet; Take 1 tablet by mouth Every Evening.  -     Cyanocobalamin 1000 MCG/ML kit; Inject 1 each as directed Every 7 (Seven) Days.  -     amitriptyline (ELAVIL) 10 MG tablet; Take 1 tablet by mouth Every Evening.  -     albuterol sulfate HFA (VENTOLIN HFA) 108 (90 Base) MCG/ACT inhaler; Inhale 2 puffs Every 6 (Six) Hours As Needed for Wheezing or Shortness of Air.    Continue with the Percocet for pain as above in this very frail, elderly, nonambulatory patient with history of breast cancer who is not a candidate for NSAIDs.  Continue to encourage her appetite and will follow closely.    Continue with meds as above for lipids, hypertension return fasting for labs.    The patient has read and signed the [facility] Controlled Substance Contract.  I will continue to see patient for regular  follow up appointments. Patient is well controlled on the medication.  BEN has been reviewed by me and is updated every 3 months. The patient is aware of the potential for addiction and dependence.           This document has been electronically signed by Dixie Tompkins MD on September 4, 2019 10:05 AM

## 2020-01-01 ENCOUNTER — TELEMEDICINE (OUTPATIENT)
Dept: FAMILY MEDICINE CLINIC | Facility: CLINIC | Age: 85
End: 2020-01-01

## 2020-01-01 ENCOUNTER — OFFICE VISIT (OUTPATIENT)
Dept: FAMILY MEDICINE CLINIC | Facility: CLINIC | Age: 85
End: 2020-01-01

## 2020-01-01 VITALS — SYSTOLIC BLOOD PRESSURE: 100 MMHG | HEIGHT: 65 IN | BODY MASS INDEX: 22.98 KG/M2 | DIASTOLIC BLOOD PRESSURE: 56 MMHG

## 2020-01-01 DIAGNOSIS — M15.9 OSTEOARTHRITIS OF MULTIPLE JOINTS, UNSPECIFIED OSTEOARTHRITIS TYPE: Primary | ICD-10-CM

## 2020-01-01 DIAGNOSIS — G89.29 CHRONIC PAIN OF MULTIPLE JOINTS: ICD-10-CM

## 2020-01-01 DIAGNOSIS — M25.50 CHRONIC PAIN OF MULTIPLE JOINTS: ICD-10-CM

## 2020-01-01 DIAGNOSIS — C50.919 PRIMARY MALIGNANT NEOPLASM OF BREAST, UNSPECIFIED LATERALITY (HCC): ICD-10-CM

## 2020-01-01 DIAGNOSIS — M53.82 NECK MUSCLE WEAKNESS: ICD-10-CM

## 2020-01-01 DIAGNOSIS — K21.9 GASTROESOPHAGEAL REFLUX DISEASE, ESOPHAGITIS PRESENCE NOT SPECIFIED: ICD-10-CM

## 2020-01-01 DIAGNOSIS — Z51.5 END OF LIFE CARE: Primary | ICD-10-CM

## 2020-01-01 DIAGNOSIS — R62.7 FAILURE TO THRIVE IN ADULT: ICD-10-CM

## 2020-01-01 PROCEDURE — 99214 OFFICE O/P EST MOD 30 MIN: CPT | Performed by: FAMILY MEDICINE

## 2020-01-01 RX ORDER — OXYCODONE AND ACETAMINOPHEN 10; 325 MG/1; MG/1
1 TABLET ORAL EVERY 6 HOURS PRN
Qty: 120 TABLET | Refills: 0 | Status: SHIPPED | OUTPATIENT
Start: 2020-01-01 | End: 2020-01-01 | Stop reason: SDUPTHER

## 2020-01-01 RX ORDER — DONEPEZIL HYDROCHLORIDE 5 MG/1
5 TABLET, FILM COATED ORAL EVERY EVENING
Qty: 30 TABLET | Refills: 5 | Status: SHIPPED | OUTPATIENT
Start: 2020-01-01

## 2020-01-01 RX ORDER — ONDANSETRON 4 MG/1
4 TABLET, FILM COATED ORAL EVERY 8 HOURS PRN
Qty: 20 TABLET | Refills: 1 | Status: SHIPPED | OUTPATIENT
Start: 2020-01-01

## 2020-01-01 RX ORDER — RISPERIDONE 0.5 MG/1
0.5 TABLET ORAL DAILY
Qty: 30 TABLET | Refills: 5 | Status: SHIPPED | OUTPATIENT
Start: 2020-01-01

## 2020-01-01 RX ORDER — LISINOPRIL 10 MG/1
TABLET ORAL
Qty: 90 TABLET | Refills: 5 | Status: SHIPPED | OUTPATIENT
Start: 2020-01-01

## 2020-01-01 RX ORDER — NYSTATIN 100000 U/G
CREAM TOPICAL AS NEEDED
Qty: 30 G | Refills: 5 | Status: SHIPPED | OUTPATIENT
Start: 2020-01-01

## 2020-01-01 RX ORDER — OMEPRAZOLE 20 MG/1
20 CAPSULE, DELAYED RELEASE ORAL DAILY
Qty: 30 CAPSULE | Refills: 5 | Status: SHIPPED | OUTPATIENT
Start: 2020-01-01

## 2020-01-01 RX ORDER — AMITRIPTYLINE HYDROCHLORIDE 10 MG/1
10 TABLET, FILM COATED ORAL EVERY EVENING
Qty: 30 TABLET | Refills: 5 | Status: SHIPPED | OUTPATIENT
Start: 2020-01-01

## 2020-01-01 RX ORDER — CEPHALEXIN 500 MG/1
500 CAPSULE ORAL 2 TIMES DAILY
Qty: 20 CAPSULE | Refills: 0 | Status: SHIPPED | OUTPATIENT
Start: 2020-01-01 | End: 2020-01-01 | Stop reason: SDUPTHER

## 2020-01-01 RX ORDER — CEPHALEXIN 500 MG/1
500 CAPSULE ORAL 2 TIMES DAILY
Qty: 20 CAPSULE | Refills: 0 | Status: SHIPPED | OUTPATIENT
Start: 2020-01-01

## 2020-01-01 RX ORDER — ALBUTEROL SULFATE 90 UG/1
2 AEROSOL, METERED RESPIRATORY (INHALATION) EVERY 6 HOURS PRN
Qty: 1 INHALER | Refills: 5 | Status: SHIPPED | OUTPATIENT
Start: 2020-01-01

## 2020-01-01 RX ORDER — OXYCODONE AND ACETAMINOPHEN 10; 325 MG/1; MG/1
1 TABLET ORAL EVERY 6 HOURS PRN
Qty: 120 TABLET | Refills: 0 | Status: SHIPPED | OUTPATIENT
Start: 2020-01-01

## 2020-01-01 RX ORDER — ESCITALOPRAM OXALATE 10 MG/1
10 TABLET ORAL
Qty: 30 TABLET | Refills: 5 | Status: SHIPPED | OUTPATIENT
Start: 2020-01-01

## 2020-01-14 NOTE — PROGRESS NOTES
Pap done, get ultrasound, see Dr. Mariah Levine for the bleeding. Need to diagnose benign or pre-cancer or cancer changes but so far no sign of obvious tumor or pelvic organ problems. Subjective   Aimee Guadalupe Gilmore is a 88 y.o. female with mild dementia, generalized osteoarthritis and who has had breast cancer here today with her daughter for follow-up.  She is nonambulatory completely.  They need a hospital bed for her as it is difficult for her to rest in a regular bed.  She has 2 have her head up a bit as she does not have strong enough muscles in her neck and her head falls over.  She goes from room to room by being pushed in a wheelchair by family member or even sometimes a family member carries her from room to room.  They also will assist her with transfers to and from the hospital bed.  Her daughter is concerned that she could get confused and fall out of the bed and thus the need for the hospital bed with the rails.  She does not need a refill of her pain medicine today but is checking up on this at this point.  Her daughter still dispenses all of her medications to her.    Her Zantac is been recalled and she needs something else for this.    Her blood pressures been running low.  Right now she is on lisinopril 20 mg in the morning and 10 in the evening.    Her daughter tries to encourage her to eat still and they have tried with various things to help her appetite but it just does not seem to have helped a lot.  She has not really been gaining any weight.  She had lost a significant amount after her  passed.  History of Present Illness   Arthritis   Presents for follow-up visit. Complains of pain, stiffness and joint swelling, reports no joint warmth. The symptoms have been worsening. Affected location: diffuse. Pain is at a severity of 8/10. Associated symptoms include fatigue, pain at night and pain while resting. Pertinent negatives include no diarrhea, dry eyes, dry mouth, dysuria, fever, rash, Raynaud's syndrome, uveitis or weight loss. Compliance with total regimen is %. Compliance with medications is %.   Neck Pain    This is a new problem.  It started a  "few days ago. The problem occurs constantly. The problem has been unchanged. The pain is present in the midline. The quality of the pain is described as aching and shooting. The pain is at a severity of 8/10. The pain is severe. The symptoms are aggravated by twisting, position and bending. The pain is same all the time. Stiffness is present in the morning, all day and at night. Associated symptoms include tingling. Pertinent negatives include no chest pain, fever, headaches, leg pain, numbness, pain with swallowing, paresis, syncope, trouble swallowing, visual change, weakness or weight loss. He has tried acetaminophen, muscle relaxants and NSAIDs for the symptoms. The treatment provided mild relief.     The following portions of the patient's history were reviewed and updated as appropriate: allergies, current medications, past family history, past medical history, past social history, past surgical history and problem list.    Review of Systems   Constitutional: Positive for activity change, appetite change and unexpected weight change.   HENT: Negative.    Respiratory: Negative.  Negative for shortness of breath.    Cardiovascular: Negative.    Gastrointestinal: Negative.    Musculoskeletal: Positive for back pain and gait problem.   Skin: Negative.    Allergic/Immunologic: Negative for immunocompromised state.   Neurological: Positive for light-headedness. Negative for dizziness, tremors, seizures and syncope.   Hematological: Negative.    Psychiatric/Behavioral: Positive for dysphoric mood. Negative for agitation, confusion and sleep disturbance. The patient is nervous/anxious.    All other systems reviewed and are negative.    Objective    Vitals:    01/14/20 1437   BP: 100/56   Weight: Comment: wheelchair   Height: 163.8 cm (64.5\")   PainSc: 0-No pain     Body mass index is 22.98 kg/m².]    Physical Exam   Constitutional: She is oriented to person, place, and time. No distress.   Very thin and frail overall "   HENT:   Head: Normocephalic and atraumatic.   Nose: Nose normal.   Mouth/Throat: Oropharynx is clear and moist.   Eyes: Pupils are equal, round, and reactive to light. Right eye exhibits no discharge. Left eye exhibits no discharge. No scleral icterus.   Neck: Normal range of motion. Neck supple. No tracheal deviation present. No thyromegaly present.   Cardiovascular: Normal rate, regular rhythm and intact distal pulses. Exam reveals gallop. Exam reveals no friction rub.   No murmur heard.  Abdominal: Soft. Bowel sounds are normal. She exhibits no distension and no mass. There is no tenderness. There is no rebound and no guarding.   Musculoskeletal: She exhibits tenderness. She exhibits no edema or deformity.   Neurological: She is alert and oriented to person, place, and time. No cranial nerve deficit. Coordination abnormal.   Skin: Skin is warm and dry. She is not diaphoretic.   Psychiatric: She has a normal mood and affect. Her behavior is normal.   Nursing note and vitals reviewed.    Assessment/Plan   Aimee was seen today for medicare wellness-initial visit.    Diagnoses and all orders for this visit:    Osteoarthritis of multiple joints, unspecified osteoarthritis type    Failure to thrive in adult    Primary malignant neoplasm of breast, unspecified laterality (CMS/HCC)    Neck muscle weakness    Gastroesophageal reflux disease, esophagitis presence not specified    Other orders  -     albuterol sulfate HFA (VENTOLIN HFA) 108 (90 Base) MCG/ACT inhaler; Inhale 2 puffs Every 6 (Six) Hours As Needed for Wheezing or Shortness of Air.  -     amitriptyline (ELAVIL) 10 MG tablet; Take 1 tablet by mouth Every Evening.  -     Cyanocobalamin 1000 MCG/ML kit; Inject 1 each as directed Every 7 (Seven) Days.  -     donepezil (ARICEPT) 5 MG tablet; Take 1 tablet by mouth Every Evening.  -     escitalopram (LEXAPRO) 10 MG tablet; Take 1 tablet by mouth every night at bedtime.  -     lisinopril (PRINIVIL,ZESTRIL) 10 MG  "tablet; Take 2 tablets in Am and 1 tablet in Pm  -     ondansetron (ZOFRAN) 4 MG tablet; Take 1 tablet by mouth Every 8 (Eight) Hours As Needed for Nausea or Vomiting.  -     risperiDONE (RISPERDAL) 0.5 MG tablet; Take 1 tablet by mouth Daily.  -     Syringe/Needle, Disp, 25G X 1\" 3 ML misc; 1 each Every 7 (Seven) Days.  -     nystatin (MYCOSTATIN) 800341 UNIT/GM cream; Apply  topically to the appropriate area as directed As Needed (rash).  -     omeprazole (PrilOSEC) 20 MG capsule; Take 1 capsule by mouth Daily.    Continue with the Percocet for pain as above in this very frail, elderly, nonambulatory patient with history of breast cancer who is not a candidate for NSAIDs.  Continue to encourage her appetite and will follow closely.     Reduce lisinopril to 10 mg twice daily and monitor blood pressures with goal of 130/80 or less but if too low and she has continued weakness let me know and will reduce further.    We will switch to Prilosec since her Zantac has been recalled.    We will order hospital bed as she has severe arthritis and severe neck muscle weakness and is unable to hold her head up.  This will allowed her to rest and elevated position with her legs elevated as well if needed.    The patient has read and signed the [facility] Controlled Substance Contract.  I will continue to see patient for regular follow up appointments. Patient is well controlled on the medication.  BEN has been reviewed by me and is updated every 3 months. The patient is aware of the potential for addiction and dependence.           This document has been electronically signed by Dixie Tompkins MD on January 14, 2020 3:03 PM           "

## 2020-04-14 NOTE — PROGRESS NOTES
Subjective   Aimee Guadalupe Gilmore is a 88 y.o. femaleSeen today by video visit.  Patient's daughter is primary historian on the phone today.  She is able to show me her mother by video who is extremely weak and lying in the bed sleeping.  The patient says her mom does not get out of bed anymore and that she requires full care.  She is not eating and drinking well with her trying to keep her drinking some Ensure or boost.  Her mother does not communicate very much anymore at all.  Since Ms. Gilmore's  passed away she has really taken a downhill slide over the past year or 2.  Her daughter is taken very good care of her at home and she says that her mother's pain seems controlled on the current medications.    History of Present Illness    The following portions of the patient's history were reviewed and updated as appropriate: allergies, current medications, past family history, past medical history, past social history, past surgical history and problem list.    Review of Systems   Constitutional: Positive for activity change, appetite change and unexpected weight change.   HENT: Negative.    Respiratory: Negative.  Negative for shortness of breath.    Cardiovascular: Negative.    Gastrointestinal: Negative.    Musculoskeletal: Positive for back pain and gait problem.   Skin: Negative.    Allergic/Immunologic: Negative for immunocompromised state.   Neurological: Positive for light-headedness. Negative for dizziness, tremors, seizures and syncope.   Hematological: Negative.    Psychiatric/Behavioral: Positive for dysphoric mood. Negative for agitation, confusion and sleep disturbance. The patient is nervous/anxious.    All other systems reviewed and are negative.      Objective   Physical Exam   Constitutional: No distress.   Patient very slim, somewhat cachectic, appears very frail   HENT:   Head: Normocephalic and atraumatic.   Eyes: Pupils are equal, round, and reactive to light. EOM are normal. Right eye  exhibits no discharge. Left eye exhibits no discharge.   Pulmonary/Chest: Effort normal.   Musculoskeletal:   Very weak overall   Neurological: She exhibits abnormal muscle tone. Coordination abnormal.   Patient drowsy, sleeping       Assessment/Plan   Diagnoses and all orders for this visit:    End of life care    Chronic pain of multiple joints  -     oxyCODONE-acetaminophen (Percocet)  MG per tablet; Take 1 tablet by mouth Every 6 (Six) Hours As Needed for Moderate Pain .     The patient has read and signed the [facility] Controlled Substance Contract.  I will continue to see patient for regular follow up appointments. Patient is well controlled on the medication.  BEN has been reviewed by me and is updated every 3 months. The patient is aware of the potential for addiction and dependence.   We will continue pain management.  I have discussed options such as hospice care which would be appropriate with the patient's daughter is doing an excellent job taking care of her at home and right now the patient has no pain issues that are controlled with her current medicine.  We will be glad to send hospice out if they decide but I really had a raysa discussion with the patient's daughter today and feel that this is most certainly end-of-life and with her not eating and drinking much I suspect death is imminent.  Her daughter understands and wishes to continue to care for her at home she had promised her mother she would.  Offered help in any way that she needs and she will contact me.        This document has been electronically signed by Dixie Tompkins MD on April 14, 2020 17:22